# Patient Record
Sex: FEMALE | Race: OTHER | NOT HISPANIC OR LATINO | URBAN - METROPOLITAN AREA
[De-identification: names, ages, dates, MRNs, and addresses within clinical notes are randomized per-mention and may not be internally consistent; named-entity substitution may affect disease eponyms.]

---

## 2023-09-15 ENCOUNTER — INPATIENT (INPATIENT)
Facility: HOSPITAL | Age: 73
LOS: 3 days | Discharge: HOME | DRG: 91 | End: 2023-09-19
Attending: INTERNAL MEDICINE | Admitting: STUDENT IN AN ORGANIZED HEALTH CARE EDUCATION/TRAINING PROGRAM
Payer: MEDICARE

## 2023-09-15 VITALS
RESPIRATION RATE: 20 BRPM | OXYGEN SATURATION: 98 % | SYSTOLIC BLOOD PRESSURE: 142 MMHG | TEMPERATURE: 97 F | DIASTOLIC BLOOD PRESSURE: 92 MMHG | HEART RATE: 96 BPM

## 2023-09-15 PROCEDURE — 99285 EMERGENCY DEPT VISIT HI MDM: CPT | Mod: FS

## 2023-09-15 RX ORDER — MORPHINE SULFATE 50 MG/1
2 CAPSULE, EXTENDED RELEASE ORAL ONCE
Refills: 0 | Status: DISCONTINUED | OUTPATIENT
Start: 2023-09-15 | End: 2023-09-15

## 2023-09-15 RX ADMIN — MORPHINE SULFATE 2 MILLIGRAM(S): 50 CAPSULE, EXTENDED RELEASE ORAL at 23:51

## 2023-09-15 NOTE — ED PROVIDER NOTE - CLINICAL SUMMARY MEDICAL DECISION MAKING FREE TEXT BOX
Patient is admitted due to intractable pain for further evaluation and treatment after neurology evaluation and persistent/recurrent debilitating pain and spasm

## 2023-09-15 NOTE — ED PROVIDER NOTE - CONSIDERATION OF ADMISSION OBSERVATION
Patient is admitted due to intractable pain for further evaluation and treatment Consideration of Admission/Observation

## 2023-09-15 NOTE — ED PROVIDER NOTE - PHYSICAL EXAMINATION
As Follows:  CONST: Sitting in chair next to stretcher, tremulous.   EYES: PERRL, EOMI, Sclera and conjunctiva clear.   CARD: No murmurs, rubs, or gallops; Normal rate and rhythm  RESP: BS Equal B/L, No wheezes, rhonchi or rales. No distress or accessory breathing  GI: Soft, non-tender, non-distended.  MS: Nontender upper/lower extremities. Normal ROM in all extremities. No midline Cervical/Thoracic/Lumbar spinal tenderness.  SKIN: Small repaired laceration to left anterior shin, no signs of infection or drainage. Warm, dry, no acute rashes. MMM  NEURO: A&Ox3, Strength and sensation intact. Tremor to bilateral hands and legs.

## 2023-09-15 NOTE — ED PROVIDER NOTE - CARE PLAN
Principal Discharge DX:	Parkinson's disease   1 Principal Discharge DX:	Intractable pain  Secondary Diagnosis:	Muscle spasm  Secondary Diagnosis:	Parkinson's disease

## 2023-09-15 NOTE — ED PROVIDER NOTE - OBJECTIVE STATEMENT
Patient is a 73 year old female with pmhx of htn, hld, dm, parkinson's on carbodopa/levodopa presents with daughter and son in law for evaluation of worsening Parkinson symptoms over the past 2 weeks resulting in increasing bilateral lower extremity and bilateral hand pain. Patient has had some difficulty ambulating requiring assistance from family. Family states she is extremely uncomfortable and typically does not complain of symptoms. Patient admits to severe pain and is requesting pain medication. She denies any fever, cough, sore throat, N/V, chest pain, sob, abdominal pain, or urinary complaints. She also denies any trauma or injury.

## 2023-09-15 NOTE — ED PROVIDER NOTE - ATTENDING APP SHARED VISIT CONTRIBUTION OF CARE
72 yo female with PMH Parkinson's, HTN, HLD, DM presents for evaluation of lower leg cramping.  Over the past 1 to 2 weeks tremors have been increasing but over the past day she started to have more pain that she could not tolerate.  Reports just spasming feeling in her hands and lower extremities.  Denies any fevers or chills, cough, shortness of breath, chest pain, headache, focal weakness.  Following closely with her neurologist who has been toggling with her medications but symptoms have not improved.    VITAL SIGNS: noted  CONSTITUTIONAL: Well-developed; well-nourished; in no acute distress  HEAD: Normocephalic; atraumatic  EYES: PERRL, EOM intact; conjunctiva and sclera clear  ENT: No nasal discharge; airway clear. MMM  NECK: Supple; non tender.    CARD: S1, S2 normal; no murmurs, gallops, or rubs. Regular rate and rhythm  RESP: CTAB/L, no wheezes, rales or rhonchi  ABD: Normal bowel sounds; soft; non-distended; non-tender; no CVA tenderness  EXT: Normal ROM. No calf tenderness or edema. Distal pulses intact  NEURO: Alert, oriented. Grossly unremarkable. No focal deficits, + tremor noted bilateral, most prominent on UEs

## 2023-09-15 NOTE — ED PROVIDER NOTE - NSFOLLOWUPINSTRUCTIONS_ED_ALL_ED_FT
Follow up with your Neurologist.     Our Emergency Department Referral Coordinators will be reaching out to you in the next 24-48 hours from 9:00am to 5:00pm with a follow up appointment. Please expect a phone call from the hospital in that time frame. If you do not receive a call or if you have any questions or concerns, you can reach them at   [phone]      You have a history of Parkinson's disease. Parkinson disease (PD) is a long-term movement disorder. The brain cells that control movement start to die and cause changes in how you move, feel, and act. Even though PD may progress and have a severe impact on your daily life, it is not a life-threatening disease. Please continue your medications as prescribed and follow up with your doctor in the outpatient setting. Anti-Parkinson medicines are used to improve movement problems, such as muscle stiffness, twitches, and restlessness.  Your healthcare provider may use several types of this medicine to help manage your symptoms. However, Anti-Parkinson medications have a propensity of producing confusion, hallucinations, delirium. If you ever have this side effects, please see your healthcare provider immediately.  Seek care immediately if:  •You feel like hurting or killing yourself or others.  •You feel lightheaded, dizzy, or faint.  •You have chest pain or shortness of breath.  •You have weakness in an arm or leg.  •You become confused, or you have difficulty speaking.  •You have dizziness, a severe headache, vision changes, auditory/visual/tactile hallucinations.  Contact your healthcare provider or neurologist if:  •You have a fever.  •You are not sleeping well or you sleep more than usual.  •You cannot eat or are eating more than usual.  •You feel that your condition is getting worse.  •You have new symptoms since your last appointment.  •Your sad feelings or thoughts change the way you function during the day.  •You have questions or concerns about your condition or care.

## 2023-09-15 NOTE — ED ADULT NURSE NOTE - NSFALLRISKINTERV_ED_ALL_ED

## 2023-09-16 DIAGNOSIS — R52 PAIN, UNSPECIFIED: ICD-10-CM

## 2023-09-16 LAB
ALBUMIN SERPL ELPH-MCNC: 4.5 G/DL — SIGNIFICANT CHANGE UP (ref 3.5–5.2)
ALBUMIN SERPL ELPH-MCNC: 4.8 G/DL — SIGNIFICANT CHANGE UP (ref 3.5–5.2)
ALP SERPL-CCNC: 78 U/L — SIGNIFICANT CHANGE UP (ref 30–115)
ALP SERPL-CCNC: 80 U/L — SIGNIFICANT CHANGE UP (ref 30–115)
ALT FLD-CCNC: <5 U/L — SIGNIFICANT CHANGE UP (ref 0–41)
ALT FLD-CCNC: <5 U/L — SIGNIFICANT CHANGE UP (ref 0–41)
ANION GAP SERPL CALC-SCNC: 14 MMOL/L — SIGNIFICANT CHANGE UP (ref 7–14)
ANION GAP SERPL CALC-SCNC: 15 MMOL/L — HIGH (ref 7–14)
APPEARANCE UR: CLEAR — SIGNIFICANT CHANGE UP
AST SERPL-CCNC: 15 U/L — SIGNIFICANT CHANGE UP (ref 0–41)
AST SERPL-CCNC: 18 U/L — SIGNIFICANT CHANGE UP (ref 0–41)
BACTERIA # UR AUTO: NEGATIVE /HPF — SIGNIFICANT CHANGE UP
BASOPHILS # BLD AUTO: 0.02 K/UL — SIGNIFICANT CHANGE UP (ref 0–0.2)
BASOPHILS # BLD AUTO: 0.02 K/UL — SIGNIFICANT CHANGE UP (ref 0–0.2)
BASOPHILS NFR BLD AUTO: 0.2 % — SIGNIFICANT CHANGE UP (ref 0–1)
BASOPHILS NFR BLD AUTO: 0.3 % — SIGNIFICANT CHANGE UP (ref 0–1)
BILIRUB SERPL-MCNC: 0.9 MG/DL — SIGNIFICANT CHANGE UP (ref 0.2–1.2)
BILIRUB SERPL-MCNC: 0.9 MG/DL — SIGNIFICANT CHANGE UP (ref 0.2–1.2)
BILIRUB UR-MCNC: NEGATIVE — SIGNIFICANT CHANGE UP
BUN SERPL-MCNC: 25 MG/DL — HIGH (ref 10–20)
BUN SERPL-MCNC: 27 MG/DL — HIGH (ref 10–20)
CALCIUM SERPL-MCNC: 9.7 MG/DL — SIGNIFICANT CHANGE UP (ref 8.4–10.5)
CALCIUM SERPL-MCNC: 9.7 MG/DL — SIGNIFICANT CHANGE UP (ref 8.4–10.5)
CAST: 1 /LPF — SIGNIFICANT CHANGE UP (ref 0–4)
CHLORIDE SERPL-SCNC: 101 MMOL/L — SIGNIFICANT CHANGE UP (ref 98–110)
CHLORIDE SERPL-SCNC: 99 MMOL/L — SIGNIFICANT CHANGE UP (ref 98–110)
CK SERPL-CCNC: 203 U/L — SIGNIFICANT CHANGE UP (ref 0–225)
CK SERPL-CCNC: 206 U/L — SIGNIFICANT CHANGE UP (ref 0–225)
CO2 SERPL-SCNC: 21 MMOL/L — SIGNIFICANT CHANGE UP (ref 17–32)
CO2 SERPL-SCNC: 21 MMOL/L — SIGNIFICANT CHANGE UP (ref 17–32)
COLOR SPEC: YELLOW — SIGNIFICANT CHANGE UP
CREAT SERPL-MCNC: 1.1 MG/DL — SIGNIFICANT CHANGE UP (ref 0.7–1.5)
CREAT SERPL-MCNC: 1.1 MG/DL — SIGNIFICANT CHANGE UP (ref 0.7–1.5)
CRP SERPL-MCNC: <3 MG/L — SIGNIFICANT CHANGE UP
CRP SERPL-MCNC: <3 MG/L — SIGNIFICANT CHANGE UP
D DIMER BLD IA.RAPID-MCNC: 228 NG/ML DDU — SIGNIFICANT CHANGE UP
DIFF PNL FLD: NEGATIVE — SIGNIFICANT CHANGE UP
EGFR: 53 ML/MIN/1.73M2 — LOW
EGFR: 53 ML/MIN/1.73M2 — LOW
EOSINOPHIL # BLD AUTO: 0.01 K/UL — SIGNIFICANT CHANGE UP (ref 0–0.7)
EOSINOPHIL # BLD AUTO: 0.05 K/UL — SIGNIFICANT CHANGE UP (ref 0–0.7)
EOSINOPHIL NFR BLD AUTO: 0.1 % — SIGNIFICANT CHANGE UP (ref 0–8)
EOSINOPHIL NFR BLD AUTO: 0.6 % — SIGNIFICANT CHANGE UP (ref 0–8)
ERYTHROCYTE [SEDIMENTATION RATE] IN BLOOD: 15 MM/HR — SIGNIFICANT CHANGE UP (ref 0–20)
ERYTHROCYTE [SEDIMENTATION RATE] IN BLOOD: 9 MM/HR — SIGNIFICANT CHANGE UP (ref 0–20)
GLUCOSE BLDC GLUCOMTR-MCNC: 111 MG/DL — HIGH (ref 70–99)
GLUCOSE BLDC GLUCOMTR-MCNC: 116 MG/DL — HIGH (ref 70–99)
GLUCOSE BLDC GLUCOMTR-MCNC: 118 MG/DL — HIGH (ref 70–99)
GLUCOSE BLDC GLUCOMTR-MCNC: 147 MG/DL — HIGH (ref 70–99)
GLUCOSE SERPL-MCNC: 105 MG/DL — HIGH (ref 70–99)
GLUCOSE SERPL-MCNC: 117 MG/DL — HIGH (ref 70–99)
GLUCOSE UR QL: NEGATIVE MG/DL — SIGNIFICANT CHANGE UP
HCT VFR BLD CALC: 38.9 % — SIGNIFICANT CHANGE UP (ref 37–47)
HCT VFR BLD CALC: 39.9 % — SIGNIFICANT CHANGE UP (ref 37–47)
HGB BLD-MCNC: 13 G/DL — SIGNIFICANT CHANGE UP (ref 12–16)
HGB BLD-MCNC: 13.3 G/DL — SIGNIFICANT CHANGE UP (ref 12–16)
IMM GRANULOCYTES NFR BLD AUTO: 0.3 % — SIGNIFICANT CHANGE UP (ref 0.1–0.3)
IMM GRANULOCYTES NFR BLD AUTO: 0.3 % — SIGNIFICANT CHANGE UP (ref 0.1–0.3)
KETONES UR-MCNC: 15 MG/DL
LEUKOCYTE ESTERASE UR-ACNC: ABNORMAL
LYMPHOCYTES # BLD AUTO: 1.68 K/UL — SIGNIFICANT CHANGE UP (ref 1.2–3.4)
LYMPHOCYTES # BLD AUTO: 1.88 K/UL — SIGNIFICANT CHANGE UP (ref 1.2–3.4)
LYMPHOCYTES # BLD AUTO: 19.2 % — LOW (ref 20.5–51.1)
LYMPHOCYTES # BLD AUTO: 24.4 % — SIGNIFICANT CHANGE UP (ref 20.5–51.1)
MAGNESIUM SERPL-MCNC: 2.1 MG/DL — SIGNIFICANT CHANGE UP (ref 1.8–2.4)
MAGNESIUM SERPL-MCNC: 2.2 MG/DL — SIGNIFICANT CHANGE UP (ref 1.8–2.4)
MCHC RBC-ENTMCNC: 30 PG — SIGNIFICANT CHANGE UP (ref 27–31)
MCHC RBC-ENTMCNC: 30.4 PG — SIGNIFICANT CHANGE UP (ref 27–31)
MCHC RBC-ENTMCNC: 33.3 G/DL — SIGNIFICANT CHANGE UP (ref 32–37)
MCHC RBC-ENTMCNC: 33.4 G/DL — SIGNIFICANT CHANGE UP (ref 32–37)
MCV RBC AUTO: 90.1 FL — SIGNIFICANT CHANGE UP (ref 81–99)
MCV RBC AUTO: 90.9 FL — SIGNIFICANT CHANGE UP (ref 81–99)
MONOCYTES # BLD AUTO: 0.56 K/UL — SIGNIFICANT CHANGE UP (ref 0.1–0.6)
MONOCYTES # BLD AUTO: 0.65 K/UL — HIGH (ref 0.1–0.6)
MONOCYTES NFR BLD AUTO: 7.3 % — SIGNIFICANT CHANGE UP (ref 1.7–9.3)
MONOCYTES NFR BLD AUTO: 7.4 % — SIGNIFICANT CHANGE UP (ref 1.7–9.3)
NEUTROPHILS # BLD AUTO: 5.19 K/UL — SIGNIFICANT CHANGE UP (ref 1.4–6.5)
NEUTROPHILS # BLD AUTO: 6.34 K/UL — SIGNIFICANT CHANGE UP (ref 1.4–6.5)
NEUTROPHILS NFR BLD AUTO: 67.1 % — SIGNIFICANT CHANGE UP (ref 42.2–75.2)
NEUTROPHILS NFR BLD AUTO: 72.8 % — SIGNIFICANT CHANGE UP (ref 42.2–75.2)
NITRITE UR-MCNC: NEGATIVE — SIGNIFICANT CHANGE UP
NRBC # BLD: 0 /100 WBCS — SIGNIFICANT CHANGE UP (ref 0–0)
NRBC # BLD: 0 /100 WBCS — SIGNIFICANT CHANGE UP (ref 0–0)
PH UR: 6 — SIGNIFICANT CHANGE UP (ref 5–8)
PHOSPHATE SERPL-MCNC: 3.5 MG/DL — SIGNIFICANT CHANGE UP (ref 2.1–4.9)
PLATELET # BLD AUTO: 258 K/UL — SIGNIFICANT CHANGE UP (ref 130–400)
PLATELET # BLD AUTO: 320 K/UL — SIGNIFICANT CHANGE UP (ref 130–400)
PMV BLD: 10.6 FL — HIGH (ref 7.4–10.4)
PMV BLD: 10.9 FL — HIGH (ref 7.4–10.4)
POTASSIUM SERPL-MCNC: 4.5 MMOL/L — SIGNIFICANT CHANGE UP (ref 3.5–5)
POTASSIUM SERPL-MCNC: 4.6 MMOL/L — SIGNIFICANT CHANGE UP (ref 3.5–5)
POTASSIUM SERPL-SCNC: 4.5 MMOL/L — SIGNIFICANT CHANGE UP (ref 3.5–5)
POTASSIUM SERPL-SCNC: 4.6 MMOL/L — SIGNIFICANT CHANGE UP (ref 3.5–5)
PROT SERPL-MCNC: 6.9 G/DL — SIGNIFICANT CHANGE UP (ref 6–8)
PROT SERPL-MCNC: 7.3 G/DL — SIGNIFICANT CHANGE UP (ref 6–8)
PROT UR-MCNC: NEGATIVE MG/DL — SIGNIFICANT CHANGE UP
RBC # BLD: 4.28 M/UL — SIGNIFICANT CHANGE UP (ref 4.2–5.4)
RBC # BLD: 4.43 M/UL — SIGNIFICANT CHANGE UP (ref 4.2–5.4)
RBC # FLD: 13.1 % — SIGNIFICANT CHANGE UP (ref 11.5–14.5)
RBC # FLD: 13.1 % — SIGNIFICANT CHANGE UP (ref 11.5–14.5)
RBC CASTS # UR COMP ASSIST: 1 /HPF — SIGNIFICANT CHANGE UP (ref 0–4)
SODIUM SERPL-SCNC: 135 MMOL/L — SIGNIFICANT CHANGE UP (ref 135–146)
SODIUM SERPL-SCNC: 136 MMOL/L — SIGNIFICANT CHANGE UP (ref 135–146)
SP GR SPEC: 1.02 — SIGNIFICANT CHANGE UP (ref 1–1.03)
SQUAMOUS # UR AUTO: 1 /HPF — SIGNIFICANT CHANGE UP (ref 0–5)
UROBILINOGEN FLD QL: 1 MG/DL — SIGNIFICANT CHANGE UP (ref 0.2–1)
WBC # BLD: 7.72 K/UL — SIGNIFICANT CHANGE UP (ref 4.8–10.8)
WBC # BLD: 8.73 K/UL — SIGNIFICANT CHANGE UP (ref 4.8–10.8)
WBC # FLD AUTO: 7.72 K/UL — SIGNIFICANT CHANGE UP (ref 4.8–10.8)
WBC # FLD AUTO: 8.73 K/UL — SIGNIFICANT CHANGE UP (ref 4.8–10.8)
WBC UR QL: 14 /HPF — HIGH (ref 0–5)

## 2023-09-16 PROCEDURE — 36415 COLL VENOUS BLD VENIPUNCTURE: CPT

## 2023-09-16 PROCEDURE — 84100 ASSAY OF PHOSPHORUS: CPT

## 2023-09-16 PROCEDURE — 82550 ASSAY OF CK (CPK): CPT

## 2023-09-16 PROCEDURE — 93970 EXTREMITY STUDY: CPT

## 2023-09-16 PROCEDURE — 85379 FIBRIN DEGRADATION QUANT: CPT

## 2023-09-16 PROCEDURE — 82962 GLUCOSE BLOOD TEST: CPT

## 2023-09-16 PROCEDURE — 85027 COMPLETE CBC AUTOMATED: CPT

## 2023-09-16 PROCEDURE — 80053 COMPREHEN METABOLIC PANEL: CPT

## 2023-09-16 PROCEDURE — 82085 ASSAY OF ALDOLASE: CPT

## 2023-09-16 PROCEDURE — 86803 HEPATITIS C AB TEST: CPT

## 2023-09-16 PROCEDURE — 85652 RBC SED RATE AUTOMATED: CPT

## 2023-09-16 PROCEDURE — 83735 ASSAY OF MAGNESIUM: CPT

## 2023-09-16 PROCEDURE — 72110 X-RAY EXAM L-2 SPINE 4/>VWS: CPT

## 2023-09-16 PROCEDURE — 83036 HEMOGLOBIN GLYCOSYLATED A1C: CPT

## 2023-09-16 PROCEDURE — 86140 C-REACTIVE PROTEIN: CPT

## 2023-09-16 PROCEDURE — 99222 1ST HOSP IP/OBS MODERATE 55: CPT

## 2023-09-16 PROCEDURE — 85025 COMPLETE CBC W/AUTO DIFF WBC: CPT

## 2023-09-16 PROCEDURE — 72050 X-RAY EXAM NECK SPINE 4/5VWS: CPT

## 2023-09-16 PROCEDURE — 97162 PT EVAL MOD COMPLEX 30 MIN: CPT | Mod: GP

## 2023-09-16 PROCEDURE — 81001 URINALYSIS AUTO W/SCOPE: CPT

## 2023-09-16 PROCEDURE — 72070 X-RAY EXAM THORAC SPINE 2VWS: CPT

## 2023-09-16 RX ORDER — CARBIDOPA AND LEVODOPA 25; 100 MG/1; MG/1
2 TABLET ORAL
Refills: 0 | DISCHARGE

## 2023-09-16 RX ORDER — GLUCAGON INJECTION, SOLUTION 0.5 MG/.1ML
1 INJECTION, SOLUTION SUBCUTANEOUS ONCE
Refills: 0 | Status: DISCONTINUED | OUTPATIENT
Start: 2023-09-16 | End: 2023-09-19

## 2023-09-16 RX ORDER — INSULIN LISPRO 100/ML
VIAL (ML) SUBCUTANEOUS
Refills: 0 | Status: DISCONTINUED | OUTPATIENT
Start: 2023-09-16 | End: 2023-09-19

## 2023-09-16 RX ORDER — SODIUM CHLORIDE 9 MG/ML
1000 INJECTION, SOLUTION INTRAVENOUS
Refills: 0 | Status: DISCONTINUED | OUTPATIENT
Start: 2023-09-16 | End: 2023-09-19

## 2023-09-16 RX ORDER — NEBIVOLOL HYDROCHLORIDE 5 MG/1
1 TABLET ORAL
Refills: 0 | DISCHARGE

## 2023-09-16 RX ORDER — ENOXAPARIN SODIUM 100 MG/ML
40 INJECTION SUBCUTANEOUS EVERY 24 HOURS
Refills: 0 | Status: DISCONTINUED | OUTPATIENT
Start: 2023-09-16 | End: 2023-09-19

## 2023-09-16 RX ORDER — CARBIDOPA AND LEVODOPA 25; 100 MG/1; MG/1
2 TABLET ORAL
Refills: 0 | Status: DISCONTINUED | OUTPATIENT
Start: 2023-09-16 | End: 2023-09-19

## 2023-09-16 RX ORDER — ZOLPIDEM TARTRATE 10 MG/1
1 TABLET ORAL
Refills: 0 | DISCHARGE

## 2023-09-16 RX ORDER — DESVENLAFAXINE 50 MG/1
50 TABLET, EXTENDED RELEASE ORAL DAILY
Refills: 0 | Status: DISCONTINUED | OUTPATIENT
Start: 2023-09-16 | End: 2023-09-18

## 2023-09-16 RX ORDER — DEXTROSE 50 % IN WATER 50 %
12.5 SYRINGE (ML) INTRAVENOUS ONCE
Refills: 0 | Status: DISCONTINUED | OUTPATIENT
Start: 2023-09-16 | End: 2023-09-19

## 2023-09-16 RX ORDER — DEXTROSE 50 % IN WATER 50 %
25 SYRINGE (ML) INTRAVENOUS ONCE
Refills: 0 | Status: DISCONTINUED | OUTPATIENT
Start: 2023-09-16 | End: 2023-09-19

## 2023-09-16 RX ORDER — CHLORHEXIDINE GLUCONATE 213 G/1000ML
1 SOLUTION TOPICAL
Refills: 0 | Status: DISCONTINUED | OUTPATIENT
Start: 2023-09-16 | End: 2023-09-19

## 2023-09-16 RX ORDER — BACLOFEN 100 %
5 POWDER (GRAM) MISCELLANEOUS EVERY 8 HOURS
Refills: 0 | Status: DISCONTINUED | OUTPATIENT
Start: 2023-09-16 | End: 2023-09-17

## 2023-09-16 RX ORDER — METFORMIN HYDROCHLORIDE 850 MG/1
1 TABLET ORAL
Refills: 0 | DISCHARGE

## 2023-09-16 RX ORDER — KETOROLAC TROMETHAMINE 30 MG/ML
15 SYRINGE (ML) INJECTION ONCE
Refills: 0 | Status: DISCONTINUED | OUTPATIENT
Start: 2023-09-16 | End: 2023-09-16

## 2023-09-16 RX ORDER — ACETAMINOPHEN 500 MG
650 TABLET ORAL EVERY 6 HOURS
Refills: 0 | Status: DISCONTINUED | OUTPATIENT
Start: 2023-09-16 | End: 2023-09-19

## 2023-09-16 RX ORDER — ZOLPIDEM TARTRATE 10 MG/1
5 TABLET ORAL AT BEDTIME
Refills: 0 | Status: DISCONTINUED | OUTPATIENT
Start: 2023-09-16 | End: 2023-09-18

## 2023-09-16 RX ORDER — BACLOFEN 100 %
5 POWDER (GRAM) MISCELLANEOUS ONCE
Refills: 0 | Status: COMPLETED | OUTPATIENT
Start: 2023-09-16 | End: 2023-09-16

## 2023-09-16 RX ORDER — DEXTROSE 50 % IN WATER 50 %
15 SYRINGE (ML) INTRAVENOUS ONCE
Refills: 0 | Status: DISCONTINUED | OUTPATIENT
Start: 2023-09-16 | End: 2023-09-19

## 2023-09-16 RX ORDER — DIAZEPAM 5 MG
2 TABLET ORAL ONCE
Refills: 0 | Status: DISCONTINUED | OUTPATIENT
Start: 2023-09-16 | End: 2023-09-16

## 2023-09-16 RX ORDER — DESVENLAFAXINE 50 MG/1
50 TABLET, EXTENDED RELEASE ORAL
Refills: 0 | DISCHARGE

## 2023-09-16 RX ADMIN — CARBIDOPA AND LEVODOPA 2 TABLET(S): 25; 100 TABLET ORAL at 18:26

## 2023-09-16 RX ADMIN — Medication 15 MILLIGRAM(S): at 01:57

## 2023-09-16 RX ADMIN — Medication 650 MILLIGRAM(S): at 06:25

## 2023-09-16 RX ADMIN — CARBIDOPA AND LEVODOPA 2 TABLET(S): 25; 100 TABLET ORAL at 12:47

## 2023-09-16 RX ADMIN — CARBIDOPA AND LEVODOPA 2 TABLET(S): 25; 100 TABLET ORAL at 21:09

## 2023-09-16 RX ADMIN — CARBIDOPA AND LEVODOPA 2 TABLET(S): 25; 100 TABLET ORAL at 14:11

## 2023-09-16 RX ADMIN — Medication 2 MILLIGRAM(S): at 00:37

## 2023-09-16 RX ADMIN — Medication 5 MILLIGRAM(S): at 14:11

## 2023-09-16 RX ADMIN — Medication 5 MILLIGRAM(S): at 05:00

## 2023-09-16 RX ADMIN — Medication 15 MILLIGRAM(S): at 09:00

## 2023-09-16 RX ADMIN — Medication 5 MILLIGRAM(S): at 21:09

## 2023-09-16 RX ADMIN — ZOLPIDEM TARTRATE 5 MILLIGRAM(S): 10 TABLET ORAL at 22:30

## 2023-09-16 RX ADMIN — ENOXAPARIN SODIUM 40 MILLIGRAM(S): 100 INJECTION SUBCUTANEOUS at 18:26

## 2023-09-16 NOTE — CONSULT NOTE ADULT - ASSESSMENT
74 yo F w PMH of Parkinsonism, DM2, HTN, HLD p/w increased pain in her both legs, predominantly at b/l knees for the last 2 weeks. She has long hx of Parkinsonism and she is on max dose of Sinemet (2000mg/day) and per  her it doesn't helping at all. Her neurological exam is remarkable for b/l akinetic-rigid syndrome with parkinsonian features, including axial muscles, spastic or mix muscle tone in b/l LE, some tenderness of back of her knee joints. No falls, no numbness, no increased weakness or LOC w seizures. Her increased pain and muscle tone most likely related to progressing her parkinsonism with prominent muscle spasms. She has an upcoming appointment w her movement specialist in 4 days and she should follow up with her. For muscle and joint/leg pain: she needs inflammatory markers to be checked with muscle enzymes as well to rule out statin induced myopathy, myositis.      Recommendations:     - ESR, CRP, CPK, Aldolase, U/A  - b/l venous doppler to rule out DVT  - Follow up w Movement disorder specialist (upcoming appointment), for now - c/w current Sinemet regimen  - Pain management per primary team  - Trial of Baclofen starting 5 mg tid w uptitrating every 3 days till 15 mg tid if no side effects  - May increase Desvenlafaxine to 100 mg/day.       Pending attending attestation.      72 yo F w PMH of Parkinsonism, DM2, HTN, HLD p/w increased pain in her both legs, predominantly at b/l knees for the last 2 weeks. She has long hx of Parkinsonism and she is on max dose of Sinemet (2000mg/day) and per  her it doesn't helping at all. Her neurological exam is remarkable for b/l akinetic-rigid syndrome with parkinsonian features, including axial muscles, spastic or mix muscle tone in b/l LE, some tenderness of back of her knee joints. No falls, no numbness, no increased weakness or LOC w seizures. Her increased pain and muscle tone most likely related to progressing her parkinsonism with prominent muscle spasms. She has an upcoming appointment w her movement specialist in 4 days and she should follow up with her. For muscle and joint/leg pain: she needs inflammatory markers to be checked with muscle enzymes as well to rule out statin induced myopathy, myositis.      Recommendations:     - ESR, CRP, CPK, Aldolase, U/A  - Follow up w Movement disorder specialist (upcoming appointment), for now - c/w current Sinemet regimen  - Pain management per primary team  - Trial of Baclofen starting 5 mg tid w uptitrating every 3 days till 15 mg tid if no side effects  - May increase Desvenlafaxine to 100 mg/day.   - c/w her home medications (if CPK elevated - may hold statins)      Pending attending attestation.      72 yo F w PMH of Parkinsonism, DM2, HTN, HLD p/w increased pain in her both legs, predominantly at b/l knees for the last 2 weeks. She has long hx of Parkinsonism and she is on max dose of Sinemet (2000mg/day) and per  her it doesn't helping at all. Her neurological exam is remarkable for b/l akinetic-rigid syndrome with parkinsonian features, including axial muscles, spastic or mix muscle tone in b/l LE, some tenderness of back of her knee joints. No falls, no numbness, no increased weakness or LOC w seizures. Her increased pain and muscle tone most likely related to progressing her parkinsonism with prominent muscle spasms. She has an upcoming appointment w her movement specialist in 4 days and she should follow up with her. For muscle and joint/leg pain: she needs inflammatory markers to be checked with muscle enzymes as well to rule out statin induced myopathy, myositis.      Recommendations:     - ESR, CRP, CPK, Aldolase, U/A  - b/l venous doppler to r/o DVT  - Follow up w Movement disorder specialist (upcoming appointment), for now - c/w current Sinemet regimen  - Pain management per primary team  - Trial of Baclofen starting 5 mg tid w uptitrating every 3 days till 15 mg tid if no side effects  - May increase Desvenlafaxine to 100 mg/day.   - c/w her home medications (if CPK elevated - may hold statins)      Pending attending attestation.

## 2023-09-16 NOTE — H&P ADULT - HIV OFFER
Cath by Dr Delong showed 80% LAD, 90% in circumflex with normal LV. Drug eluding stents placed in both.    Opt out

## 2023-09-16 NOTE — H&P ADULT - HISTORY OF PRESENT ILLNESS
73 year old female PMHx HTN, HLD, DM Type II, Parkinson's, presents to the ED with increased pain in b/l legs for the last 2 weeks. She is on the max dose of Sinemet (2000mg/day) but has had worsening pain and muscle spasms.  She has an upcoming appointment with her movement specialist. Denies fevers, urinary/bowel incontinence, saddle anesthesia, chest pain, SOB, N/V/D. Currently pain and spasms are well controlled.    T(F): 99.7 HR: 98 BP: 143/86 RR: 18 SpO2: 95%  Labs unremarkable  U/A negative   Admit to Medicine

## 2023-09-16 NOTE — CHART NOTE - NSCHARTNOTEFT_GEN_A_CORE
Went back to speak to patient to better clarify her diagnosis and current complaints.  I also called daughter Melissa while I was with patient.  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  She has had dx of parkinsons for 13 years and has been following with neurologist in [facility].  She has had contiued medication adjustments of her sinemet over the last few months because she believes she is getting worse.    More recently she has been having lower back pain which in the past has been intermittent and for last 1.5 weeks painful spasms in the LE mostly at the calves b/l.  She has been trying to deal with it for last week and has been less ambulatory because of these spasms.  She came to hospital because the spasms in the calves become so severe that she was screaming in pain and could not stand on her feet.  The daughter believes her mother is getting too much medication and she has not been improving.  Patient currently on re-evaluation is lying on her left side and says she is having severe lower back pain and asking for pain medications.    Would recommend obtaining imaging of her spine as her current admission appears to be more related to back pain and spasms.  While the high dose of carbidopa can increase the chance of spasms would need to evaluate for structural causes first.    1. Xray cervical, thoracic and lumbar spine (patient does not want MRI unless she is sedated or heavily medicated)  2. Continue home dose of sinemet (daughter bringing in her medications shortly)  3. Would give baclofen standing at 5mg q8 hours for now  4. pain control  5. Avoid skipping doses of sinemet as this may cause the patient to go through dopamine withdrawal which can significantly worsen neurological symptoms  6. Will follow

## 2023-09-16 NOTE — PATIENT PROFILE ADULT - FALL HARM RISK - HARM RISK INTERVENTIONS

## 2023-09-16 NOTE — H&P ADULT - NSHPPHYSICALEXAM_GEN_ALL_CORE
GENERAL: NAD, lying in bed comfortably  CHEST/LUNG: CTAB  HEART: RRR no MRG  ABDOMEN: BSx4; Soft, nontender, nondistended  EXTREMITIES: No edema  NERVOUS SYSTEM:  A&Ox3, no focal deficits. Cogwheel rigidity and tremors in b/l ULEs. Tenderness with ROM in b/l knees.  SKIN: No rashes or lesions GENERAL: NAD, lying in bed comfortably  CHEST/LUNG: CTAB  HEART: RRR no MRG  ABDOMEN: BSx4; Soft, nontender, nondistended  EXTREMITIES: No edema  NERVOUS SYSTEM:  A&Ox3, no focal deficits. Cogwheel rigidity and tremors in b/l ULEs. Tenderness and pain with ROM in b/l knees.  SKIN: No rashes or lesions

## 2023-09-16 NOTE — H&P ADULT - NSHPLABSRESULTS_GEN_ALL_CORE
LABS:                        13.0   8.73  )-----------( 258      ( 16 Sep 2023 05:38 )             38.9     09-16    136  |  101  |  27<H>  ----------------------------<  117<H>  4.5   |  21  |  1.1    Ca    9.7      16 Sep 2023 05:38  Phos  3.5     09-16  Mg     2.2     09-16    TPro  6.9  /  Alb  4.5  /  TBili  0.9  /  DBili  x   /  AST  15  /  ALT  <5  /  AlkPhos  78  09-16

## 2023-09-16 NOTE — CONSULT NOTE ADULT - SUBJECTIVE AND OBJECTIVE BOX
NEUROLOGY CONSULT    HPI: 72 yo F w PMH of Parkinsonism, DM2, HTN, HLD p/w increased pain in her both legs, predominantly at b/l knees for the last 2 weeks. Today her symptoms got worse, and when her kids came to visit her, they decided to go to the ED. Morphine 2 IV, Diazepam 2 IV was given with transient shor-time effect, Ketorolac was given recently. She has parkinsonism tremor and rigidity for more than 16 years, cannot tell the specifics but following movement specialist in [place] and she is on Sinemet 500 qid (10-14-18-22) and she states that it was increased recently w/o effect. Other than that she is on Desvenlafaxine 50 mg, Bystolic 5, Lipitor 20, Metformin 500 tid, Zolpidem 10 mg.          MEDICATIONS  Home Medications:    MEDICATIONS  (STANDING):    MEDICATIONS  (PRN):      FAMILY HISTORY:    SOCIAL HISTORY: negative for tobacco, alcohol, or ilicit drug use.    Allergies    No Known Allergies    Intolerances             NEUROLOGICAL EXAMINATION:  GENERAL:  Appearance is consistent with chronologic age, anxious, keep stating "help me doctor"   COGNITION/LANGUAGE:  Awake, alert, and oriented to person, place, time and date.  Fluent, intact comprehension, repetition, naming. Recent and remote memory intact.  Fund of knowledge is appropriate.  Nondysarthric.    CRANIAL NERVES:   - Eyes:  Visual acuity intact. Pupils equal round and reactive, no RAPD. EOMI w/o nystagmus, skew or reported double vision.   No ptosis/weakness of eyelid closure.   - Face:  Amimia no facial asymmetry.    - Ears/Nose/Throat:  Hearing grossly intact b/l to finger rub.  Palate elevates midline.  Tongue and uvula midline.   MOTOR EXAM:  (R/L) 5/5 UE; -5/-5 LE.  No observable drift. Cogwheeling in b/l arms, spastic tone in b/l lower extremities. Tenderness over knee joints, LE muscles, b/l tremors in UE.    SENSORY EXAM:  Intact to light touch and pinprick, pain, temperature and proprioception and vibration in all extremities.  REFLEXES:   2+ b/l biceps, triceps, patella and achilles.  Plantar response downgoing b/l.  Jaw jerk, Dionicio, clonus absent.  CEREBELLUM:  Finger to nose/Heel to knee and shin intact.  No dysmetria.    GAIT: shuffling gait, turning en-block.   Romberg: negative.      LABS:                        13.3   7.72  )-----------( 320      ( 16 Sep 2023 00:00 )             39.9     09-16    135  |  99  |  25<H>  ----------------------------<  105<H>  4.6   |  21  |  1.1    Ca    9.7      16 Sep 2023 00:00  Mg     2.1     09-16    TPro  7.3  /  Alb  4.8  /  TBili  0.9  /  DBili  x   /  AST  18  /  ALT  <5  /  AlkPhos  80  09-16    Hemoglobin A1C:   Vitamin B12     CAPILLARY BLOOD GLUCOSE  Urinalysis Basic - ( 16 Sep 2023 00:00 )  Color: x / Appearance: x / SG: x / pH: x  Gluc: 105 mg/dL / Ketone: x  / Bili: x / Urobili: x   Blood: x / Protein: x / Nitrite: x   Leuk Esterase: x / RBC: x / WBC x   Sq Epi: x / Non Sq Epi: x / Bacteria: x    Microbiology:    RADIOLOGY, EKG AND ADDITIONAL TESTS: Reviewed.           NEUROLOGY CONSULT    HPI: 74 yo F w PMH of Parkinsonism, DM2, HTN, HLD p/w increased pain in her both legs, predominantly at b/l knees for the last 2 weeks. Today her symptoms got worse, and when her kids came to visit her, they decided to go to the ED. In the ED: Morphine 2 IV, Diazepam 2 IV was given with transient short-time effect, Ketorolac was given recently. She has parkinsonism tremor and rigidity for more than 16 years, cannot tell the specifics but following movement specialist in [place] and she is on Sinemet 500 qid (10-14-18-22) and she states that it was increased recently w/o effect. Other than that she is on Desvenlafaxine 50 mg, Bystolic 5, Lipitor 20, Metformin 500 tid, Zolpidem 10 mg.          MEDICATIONS  Home Medications:    MEDICATIONS  (STANDING):    MEDICATIONS  (PRN):      FAMILY HISTORY:    SOCIAL HISTORY: negative for tobacco, alcohol, or ilicit drug use.    Allergies    No Known Allergies    Intolerances             NEUROLOGICAL EXAMINATION:  GENERAL:  Appearance is consistent with chronologic age, anxious, keep stating "help me doctor"   COGNITION/LANGUAGE:  Awake, alert, and oriented to person, place, time and date.  Fluent, intact comprehension, repetition, naming. Recent and remote memory intact.  Fund of knowledge is appropriate.  Nondysarthric.    CRANIAL NERVES:   - Eyes:  Visual acuity intact. Pupils equal round and reactive, no RAPD. EOMI w/o nystagmus, skew or reported double vision.   No ptosis/weakness of eyelid closure.   - Face:  Amimia no facial asymmetry.    - Ears/Nose/Throat:  Hearing grossly intact b/l to finger rub.  Palate elevates midline.  Tongue and uvula midline.   MOTOR EXAM:  (R/L) 5/5 UE; -5/-5 LE.  No observable drift. Cogwheeling in b/l arms, spastic tone in b/l lower extremities. Tenderness over knee joints, LE muscles, b/l tremors in UE.    SENSORY EXAM:  Intact to light touch and pinprick, pain, temperature and proprioception and vibration in all extremities.  REFLEXES:   2+ b/l biceps, triceps, patella and achilles.  Plantar response downgoing b/l.  Jaw jerk, Dionicio, clonus absent.  CEREBELLUM:  Finger to nose/Heel to knee and shin intact.  No dysmetria.    GAIT: shuffling gait, turning en-block.   Romberg: negative.      LABS:                        13.3   7.72  )-----------( 320      ( 16 Sep 2023 00:00 )             39.9     09-16    135  |  99  |  25<H>  ----------------------------<  105<H>  4.6   |  21  |  1.1    Ca    9.7      16 Sep 2023 00:00  Mg     2.1     09-16    TPro  7.3  /  Alb  4.8  /  TBili  0.9  /  DBili  x   /  AST  18  /  ALT  <5  /  AlkPhos  80  09-16    Hemoglobin A1C:   Vitamin B12     CAPILLARY BLOOD GLUCOSE  Urinalysis Basic - ( 16 Sep 2023 00:00 )  Color: x / Appearance: x / SG: x / pH: x  Gluc: 105 mg/dL / Ketone: x  / Bili: x / Urobili: x   Blood: x / Protein: x / Nitrite: x   Leuk Esterase: x / RBC: x / WBC x   Sq Epi: x / Non Sq Epi: x / Bacteria: x    Microbiology:    RADIOLOGY, EKG AND ADDITIONAL TESTS: Reviewed.

## 2023-09-16 NOTE — CONSULT NOTE ADULT - ATTENDING COMMENTS
Patient seen and examined and agree with above except as noted.  Patients history, notes, labs, imaging, vitals and meds reviewed personally.  Patients presentation related to b/l LE pain.  Has increased tone in LE and is being treated by movement specialist in [facility] for Parkinsonism currently on high dose Sinemet.  Would not change current medications however recommend following with movement specialist (appointment in 3 days) and should discuss about evaluation for DBS (unclear whether she would be a candidate given poor response to dopamine agonists).    Plan as above

## 2023-09-16 NOTE — H&P ADULT - NSHPREVIEWOFSYSTEMS_GEN_ALL_CORE
CONSTITUTIONAL: No weakness, fevers, or chills  EYES/ENT: No visual changes;  No vertigo or throat pain   NECK: No pain or stiffness  RESPIRATORY: No cough, wheezing, hemoptysis; No shortness of breath  CARDIOVASCULAR: No chest pain or palpitations  GASTROINTESTINAL: No abdominal or epigastric pain; No nausea, vomiting, diarrhea, or constipation; No hematemesis, melena, or hematochezia  GENITOURINARY: No dysuria, frequency, or hematuria  NEUROLOGICAL: No numbness   SKIN: No itching or new rashes

## 2023-09-16 NOTE — H&P ADULT - ASSESSMENT
73 year old female PMHx HTN, HLD, DM Type II, Parkinson's, presents to the ED with increased pain in b/l legs for the last 2 weeks. She is on the max dose of Sinemet (2000mg/day) but has had worsening pain and muscle spasms. Has an appointment with Movement specialist in a few days.    # Worsening pain and muscle spasm likely 2/2 progression of Parkinson's  # hx Parkinson's 15 years  - c/w Sinemet 1000/50 4x/day  - CPK, inflammatory markers wnl  - f/u venous duplex  - Neuro consulted  - trial of Baclofen starting with 5mg TID; up-titrate to 15mg TID if no side effects  - may increase Desvenlafaxine to 100mg/day (daughter will bring from home)  - f/u neuro for any further recommendations     # HTN  - Bistolic 5mg     # HLD  - Atorvastatin 20mg     # DM  - home med: Metformin 500mg BID  - ISS, monitor FS, target 140-180    #DVT ppx: Lovenox  #Diet: DASH  #Activity: IAT  #Dispo: Medicine            73 year old female PMHx HTN, HLD, DM Type II, Parkinson's, presents to the ED with increased pain in b/l legs for the last 2 weeks. She is on the max dose of Sinemet (2000mg/day) but has had worsening pain and muscle spasms. Has an appointment with Movement specialist in a few days.    # Worsening pain and muscle spasm likely 2/2 progression of Parkinson's  # hx Parkinson's 15 years  - c/w Sinemet 1000/50 4x/day  - CPK, inflammatory markers, electrolytes wnl  - f/u venous duplex  - Neuro consulted  - trial of Baclofen starting with 5mg TID; up-titrate to 15mg TID if no side effects  - may increase Desvenlafaxine to 100mg/day (daughter will bring from home)  - f/u neuro for any further recommendations     # HTN  - Bistolic 5mg     # HLD  - Atorvastatin 20mg     # DM  - home med: Metformin 500mg BID  - ISS, monitor FS, target 140-180    #DVT ppx: Lovenox  #Diet: DASH  #Activity: IAT  #Dispo: Medicine            73 year old female PMHx HTN, HLD, DM Type II, Parkinson's, presents to the ED with increased pain in b/l legs for the last 2 weeks. She is on the max dose of Sinemet (2000mg/day) but has had worsening pain and muscle spasms. Has an appointment with Movement specialist in a few days.    # Worsening pain and muscle spasm likely 2/2 progression of Parkinson's  # hx Parkinson's 15 years  - pain and spasms currently well controlled >> possible on-off phenomena  - c/w Sinemet 1000/50 4x/day  - CPK, inflammatory markers, electrolytes wnl  - f/u venous duplex  - Neuro consulted  - trial of Baclofen starting with 5mg TID; up-titrate to 15mg TID if no side effects  - may increase Desvenlafaxine to 100mg/day (daughter will bring from home)  - f/u movement specialist out-patient     # HTN  - Bistolic 5mg     # HLD  - Atorvastatin 20mg     # DM  - home med: Metformin 500mg BID  - ISS, monitor FS, target 140-180    #DVT ppx: Lovenox  #Diet: DASH  #Activity: IAT  #Dispo: Medicine

## 2023-09-16 NOTE — H&P ADULT - ATTENDING COMMENTS
73 year old female PMHx HTN, HLD, DM Type II, Parkinson's, presents to the ED with increased pain in b/l legs for the last 2 weeks. She is on the max dose of Sinemet (2000mg/day) but has had worsening pain and muscle spasms. Has an appointment with Movement specialist in a few days. Vitals stable. Exam benign. Labs and imaging reviewed.    # Worsening pain and muscle spasm likely 2/2 progression of Parkinson's  # hx Parkinson's 15 years  - pain and spasms currently well controlled >> possible on-off phenomena  - c/w Sinemet 1000/50 4x/day  - CPK, inflammatory markers, electrolytes wnl  - f/u venous duplex  - Neuro consulted; follow their rec  - trial of Baclofen starting with 5mg TID; up-titrate to 15mg TID if no side effects  - may increase Desvenlafaxine to 100mg/day (daughter will bring from home)  - f/u movement specialist out-patient     # HTN  - Bistolic 5mg     # HLD  - Atorvastatin 20mg     # DM  - home med: Metformin 500mg BID  - ISS, monitor FS, target 140-180    #DVT ppx: Lovenox  #Diet: DASH  #Activity: IAT  #Dispo: Medicine

## 2023-09-17 LAB
ALBUMIN SERPL ELPH-MCNC: 4.5 G/DL — SIGNIFICANT CHANGE UP (ref 3.5–5.2)
ALP SERPL-CCNC: 69 U/L — SIGNIFICANT CHANGE UP (ref 30–115)
ALT FLD-CCNC: <5 U/L — SIGNIFICANT CHANGE UP (ref 0–41)
ANION GAP SERPL CALC-SCNC: 17 MMOL/L — HIGH (ref 7–14)
AST SERPL-CCNC: 16 U/L — SIGNIFICANT CHANGE UP (ref 0–41)
BASOPHILS # BLD AUTO: 0.04 K/UL — SIGNIFICANT CHANGE UP (ref 0–0.2)
BASOPHILS NFR BLD AUTO: 0.5 % — SIGNIFICANT CHANGE UP (ref 0–1)
BILIRUB SERPL-MCNC: 1 MG/DL — SIGNIFICANT CHANGE UP (ref 0.2–1.2)
BUN SERPL-MCNC: 29 MG/DL — HIGH (ref 10–20)
CALCIUM SERPL-MCNC: 9.3 MG/DL — SIGNIFICANT CHANGE UP (ref 8.4–10.5)
CHLORIDE SERPL-SCNC: 102 MMOL/L — SIGNIFICANT CHANGE UP (ref 98–110)
CO2 SERPL-SCNC: 20 MMOL/L — SIGNIFICANT CHANGE UP (ref 17–32)
CREAT SERPL-MCNC: 1 MG/DL — SIGNIFICANT CHANGE UP (ref 0.7–1.5)
EGFR: 60 ML/MIN/1.73M2 — SIGNIFICANT CHANGE UP
EOSINOPHIL # BLD AUTO: 0.05 K/UL — SIGNIFICANT CHANGE UP (ref 0–0.7)
EOSINOPHIL NFR BLD AUTO: 0.7 % — SIGNIFICANT CHANGE UP (ref 0–8)
GLUCOSE BLDC GLUCOMTR-MCNC: 101 MG/DL — HIGH (ref 70–99)
GLUCOSE BLDC GLUCOMTR-MCNC: 108 MG/DL — HIGH (ref 70–99)
GLUCOSE BLDC GLUCOMTR-MCNC: 124 MG/DL — HIGH (ref 70–99)
GLUCOSE BLDC GLUCOMTR-MCNC: 136 MG/DL — HIGH (ref 70–99)
GLUCOSE SERPL-MCNC: 101 MG/DL — HIGH (ref 70–99)
HCT VFR BLD CALC: 39.2 % — SIGNIFICANT CHANGE UP (ref 37–47)
HGB BLD-MCNC: 13.1 G/DL — SIGNIFICANT CHANGE UP (ref 12–16)
IMM GRANULOCYTES NFR BLD AUTO: 0.1 % — SIGNIFICANT CHANGE UP (ref 0.1–0.3)
LYMPHOCYTES # BLD AUTO: 2.12 K/UL — SIGNIFICANT CHANGE UP (ref 1.2–3.4)
LYMPHOCYTES # BLD AUTO: 27.6 % — SIGNIFICANT CHANGE UP (ref 20.5–51.1)
MAGNESIUM SERPL-MCNC: 2 MG/DL — SIGNIFICANT CHANGE UP (ref 1.8–2.4)
MCHC RBC-ENTMCNC: 30 PG — SIGNIFICANT CHANGE UP (ref 27–31)
MCHC RBC-ENTMCNC: 33.4 G/DL — SIGNIFICANT CHANGE UP (ref 32–37)
MCV RBC AUTO: 89.9 FL — SIGNIFICANT CHANGE UP (ref 81–99)
MONOCYTES # BLD AUTO: 0.59 K/UL — SIGNIFICANT CHANGE UP (ref 0.1–0.6)
MONOCYTES NFR BLD AUTO: 7.7 % — SIGNIFICANT CHANGE UP (ref 1.7–9.3)
NEUTROPHILS # BLD AUTO: 4.87 K/UL — SIGNIFICANT CHANGE UP (ref 1.4–6.5)
NEUTROPHILS NFR BLD AUTO: 63.4 % — SIGNIFICANT CHANGE UP (ref 42.2–75.2)
NRBC # BLD: 0 /100 WBCS — SIGNIFICANT CHANGE UP (ref 0–0)
PLATELET # BLD AUTO: 261 K/UL — SIGNIFICANT CHANGE UP (ref 130–400)
PMV BLD: 10.8 FL — HIGH (ref 7.4–10.4)
POTASSIUM SERPL-MCNC: 4.2 MMOL/L — SIGNIFICANT CHANGE UP (ref 3.5–5)
POTASSIUM SERPL-SCNC: 4.2 MMOL/L — SIGNIFICANT CHANGE UP (ref 3.5–5)
PROT SERPL-MCNC: 6.8 G/DL — SIGNIFICANT CHANGE UP (ref 6–8)
RBC # BLD: 4.36 M/UL — SIGNIFICANT CHANGE UP (ref 4.2–5.4)
RBC # FLD: 12.9 % — SIGNIFICANT CHANGE UP (ref 11.5–14.5)
SODIUM SERPL-SCNC: 139 MMOL/L — SIGNIFICANT CHANGE UP (ref 135–146)
WBC # BLD: 7.68 K/UL — SIGNIFICANT CHANGE UP (ref 4.8–10.8)
WBC # FLD AUTO: 7.68 K/UL — SIGNIFICANT CHANGE UP (ref 4.8–10.8)

## 2023-09-17 PROCEDURE — 93970 EXTREMITY STUDY: CPT | Mod: 26

## 2023-09-17 PROCEDURE — 99232 SBSQ HOSP IP/OBS MODERATE 35: CPT

## 2023-09-17 RX ORDER — LANOLIN ALCOHOL/MO/W.PET/CERES
5 CREAM (GRAM) TOPICAL AT BEDTIME
Refills: 0 | Status: DISCONTINUED | OUTPATIENT
Start: 2023-09-17 | End: 2023-09-19

## 2023-09-17 RX ORDER — CYCLOBENZAPRINE HYDROCHLORIDE 10 MG/1
5 TABLET, FILM COATED ORAL THREE TIMES A DAY
Refills: 0 | Status: DISCONTINUED | OUTPATIENT
Start: 2023-09-17 | End: 2023-09-18

## 2023-09-17 RX ADMIN — CHLORHEXIDINE GLUCONATE 1 APPLICATION(S): 213 SOLUTION TOPICAL at 05:20

## 2023-09-17 RX ADMIN — CARBIDOPA AND LEVODOPA 2 TABLET(S): 25; 100 TABLET ORAL at 14:52

## 2023-09-17 RX ADMIN — Medication 5 MILLIGRAM(S): at 05:20

## 2023-09-17 RX ADMIN — CARBIDOPA AND LEVODOPA 2 TABLET(S): 25; 100 TABLET ORAL at 10:40

## 2023-09-17 RX ADMIN — Medication 650 MILLIGRAM(S): at 06:48

## 2023-09-17 RX ADMIN — CYCLOBENZAPRINE HYDROCHLORIDE 5 MILLIGRAM(S): 10 TABLET, FILM COATED ORAL at 21:10

## 2023-09-17 RX ADMIN — ZOLPIDEM TARTRATE 5 MILLIGRAM(S): 10 TABLET ORAL at 21:10

## 2023-09-17 RX ADMIN — CARBIDOPA AND LEVODOPA 2 TABLET(S): 25; 100 TABLET ORAL at 17:23

## 2023-09-17 RX ADMIN — ENOXAPARIN SODIUM 40 MILLIGRAM(S): 100 INJECTION SUBCUTANEOUS at 17:24

## 2023-09-17 RX ADMIN — DESVENLAFAXINE 50 MILLIGRAM(S): 50 TABLET, EXTENDED RELEASE ORAL at 14:51

## 2023-09-17 RX ADMIN — CARBIDOPA AND LEVODOPA 2 TABLET(S): 25; 100 TABLET ORAL at 21:10

## 2023-09-17 RX ADMIN — Medication 650 MILLIGRAM(S): at 05:48

## 2023-09-18 LAB
A1C WITH ESTIMATED AVERAGE GLUCOSE RESULT: 6.7 % — HIGH (ref 4–5.6)
ALBUMIN SERPL ELPH-MCNC: 4.4 G/DL — SIGNIFICANT CHANGE UP (ref 3.5–5.2)
ALP SERPL-CCNC: 67 U/L — SIGNIFICANT CHANGE UP (ref 30–115)
ALT FLD-CCNC: <5 U/L — SIGNIFICANT CHANGE UP (ref 0–41)
ANION GAP SERPL CALC-SCNC: 12 MMOL/L — SIGNIFICANT CHANGE UP (ref 7–14)
AST SERPL-CCNC: 16 U/L — SIGNIFICANT CHANGE UP (ref 0–41)
BASOPHILS # BLD AUTO: 0.03 K/UL — SIGNIFICANT CHANGE UP (ref 0–0.2)
BASOPHILS NFR BLD AUTO: 0.5 % — SIGNIFICANT CHANGE UP (ref 0–1)
BILIRUB SERPL-MCNC: 0.9 MG/DL — SIGNIFICANT CHANGE UP (ref 0.2–1.2)
BUN SERPL-MCNC: 32 MG/DL — HIGH (ref 10–20)
CALCIUM SERPL-MCNC: 9.5 MG/DL — SIGNIFICANT CHANGE UP (ref 8.4–10.5)
CHLORIDE SERPL-SCNC: 103 MMOL/L — SIGNIFICANT CHANGE UP (ref 98–110)
CO2 SERPL-SCNC: 23 MMOL/L — SIGNIFICANT CHANGE UP (ref 17–32)
CREAT SERPL-MCNC: 0.9 MG/DL — SIGNIFICANT CHANGE UP (ref 0.7–1.5)
EGFR: 68 ML/MIN/1.73M2 — SIGNIFICANT CHANGE UP
EOSINOPHIL # BLD AUTO: 0.04 K/UL — SIGNIFICANT CHANGE UP (ref 0–0.7)
EOSINOPHIL NFR BLD AUTO: 0.7 % — SIGNIFICANT CHANGE UP (ref 0–8)
ESTIMATED AVERAGE GLUCOSE: 146 MG/DL — HIGH (ref 68–114)
GLUCOSE BLDC GLUCOMTR-MCNC: 110 MG/DL — HIGH (ref 70–99)
GLUCOSE BLDC GLUCOMTR-MCNC: 135 MG/DL — HIGH (ref 70–99)
GLUCOSE BLDC GLUCOMTR-MCNC: 173 MG/DL — HIGH (ref 70–99)
GLUCOSE BLDC GLUCOMTR-MCNC: 232 MG/DL — HIGH (ref 70–99)
GLUCOSE SERPL-MCNC: 107 MG/DL — HIGH (ref 70–99)
HCT VFR BLD CALC: 38.8 % — SIGNIFICANT CHANGE UP (ref 37–47)
HCV AB S/CO SERPL IA: 0.04 COI — SIGNIFICANT CHANGE UP
HCV AB SERPL-IMP: SIGNIFICANT CHANGE UP
HGB BLD-MCNC: 13 G/DL — SIGNIFICANT CHANGE UP (ref 12–16)
IMM GRANULOCYTES NFR BLD AUTO: 0.2 % — SIGNIFICANT CHANGE UP (ref 0.1–0.3)
LYMPHOCYTES # BLD AUTO: 1.91 K/UL — SIGNIFICANT CHANGE UP (ref 1.2–3.4)
LYMPHOCYTES # BLD AUTO: 33.5 % — SIGNIFICANT CHANGE UP (ref 20.5–51.1)
MAGNESIUM SERPL-MCNC: 2 MG/DL — SIGNIFICANT CHANGE UP (ref 1.8–2.4)
MCHC RBC-ENTMCNC: 30.8 PG — SIGNIFICANT CHANGE UP (ref 27–31)
MCHC RBC-ENTMCNC: 33.5 G/DL — SIGNIFICANT CHANGE UP (ref 32–37)
MCV RBC AUTO: 91.9 FL — SIGNIFICANT CHANGE UP (ref 81–99)
MONOCYTES # BLD AUTO: 0.45 K/UL — SIGNIFICANT CHANGE UP (ref 0.1–0.6)
MONOCYTES NFR BLD AUTO: 7.9 % — SIGNIFICANT CHANGE UP (ref 1.7–9.3)
NEUTROPHILS # BLD AUTO: 3.27 K/UL — SIGNIFICANT CHANGE UP (ref 1.4–6.5)
NEUTROPHILS NFR BLD AUTO: 57.2 % — SIGNIFICANT CHANGE UP (ref 42.2–75.2)
NRBC # BLD: 0 /100 WBCS — SIGNIFICANT CHANGE UP (ref 0–0)
PLATELET # BLD AUTO: 240 K/UL — SIGNIFICANT CHANGE UP (ref 130–400)
PMV BLD: 10.6 FL — HIGH (ref 7.4–10.4)
POTASSIUM SERPL-MCNC: 4.6 MMOL/L — SIGNIFICANT CHANGE UP (ref 3.5–5)
POTASSIUM SERPL-SCNC: 4.6 MMOL/L — SIGNIFICANT CHANGE UP (ref 3.5–5)
PROT SERPL-MCNC: 6.5 G/DL — SIGNIFICANT CHANGE UP (ref 6–8)
RBC # BLD: 4.22 M/UL — SIGNIFICANT CHANGE UP (ref 4.2–5.4)
RBC # FLD: 13.2 % — SIGNIFICANT CHANGE UP (ref 11.5–14.5)
SODIUM SERPL-SCNC: 138 MMOL/L — SIGNIFICANT CHANGE UP (ref 135–146)
WBC # BLD: 5.71 K/UL — SIGNIFICANT CHANGE UP (ref 4.8–10.8)
WBC # FLD AUTO: 5.71 K/UL — SIGNIFICANT CHANGE UP (ref 4.8–10.8)

## 2023-09-18 PROCEDURE — 72050 X-RAY EXAM NECK SPINE 4/5VWS: CPT | Mod: 26

## 2023-09-18 PROCEDURE — 72070 X-RAY EXAM THORAC SPINE 2VWS: CPT | Mod: 26

## 2023-09-18 PROCEDURE — 99232 SBSQ HOSP IP/OBS MODERATE 35: CPT

## 2023-09-18 PROCEDURE — 72110 X-RAY EXAM L-2 SPINE 4/>VWS: CPT | Mod: 26

## 2023-09-18 RX ORDER — BACLOFEN 100 %
5 POWDER (GRAM) MISCELLANEOUS EVERY 8 HOURS
Refills: 0 | Status: DISCONTINUED | OUTPATIENT
Start: 2023-09-18 | End: 2023-09-19

## 2023-09-18 RX ORDER — DESVENLAFAXINE 50 MG/1
50 TABLET, EXTENDED RELEASE ORAL DAILY
Refills: 0 | Status: DISCONTINUED | OUTPATIENT
Start: 2023-09-19 | End: 2023-09-19

## 2023-09-18 RX ORDER — METOPROLOL TARTRATE 50 MG
25 TABLET ORAL DAILY
Refills: 0 | Status: DISCONTINUED | OUTPATIENT
Start: 2023-09-18 | End: 2023-09-19

## 2023-09-18 RX ADMIN — Medication 1: at 12:14

## 2023-09-18 RX ADMIN — CARBIDOPA AND LEVODOPA 2 TABLET(S): 25; 100 TABLET ORAL at 12:13

## 2023-09-18 RX ADMIN — Medication 5 MILLIGRAM(S): at 16:50

## 2023-09-18 RX ADMIN — ENOXAPARIN SODIUM 40 MILLIGRAM(S): 100 INJECTION SUBCUTANEOUS at 18:08

## 2023-09-18 RX ADMIN — CHLORHEXIDINE GLUCONATE 1 APPLICATION(S): 213 SOLUTION TOPICAL at 05:31

## 2023-09-18 RX ADMIN — DESVENLAFAXINE 50 MILLIGRAM(S): 50 TABLET, EXTENDED RELEASE ORAL at 12:15

## 2023-09-18 RX ADMIN — CYCLOBENZAPRINE HYDROCHLORIDE 5 MILLIGRAM(S): 10 TABLET, FILM COATED ORAL at 15:34

## 2023-09-18 RX ADMIN — Medication 650 MILLIGRAM(S): at 23:52

## 2023-09-18 RX ADMIN — Medication 5 MILLIGRAM(S): at 23:52

## 2023-09-18 RX ADMIN — CARBIDOPA AND LEVODOPA 2 TABLET(S): 25; 100 TABLET ORAL at 17:59

## 2023-09-18 RX ADMIN — CARBIDOPA AND LEVODOPA 2 TABLET(S): 25; 100 TABLET ORAL at 15:33

## 2023-09-18 RX ADMIN — CYCLOBENZAPRINE HYDROCHLORIDE 5 MILLIGRAM(S): 10 TABLET, FILM COATED ORAL at 05:32

## 2023-09-18 RX ADMIN — Medication 5 MILLIGRAM(S): at 21:20

## 2023-09-18 RX ADMIN — CARBIDOPA AND LEVODOPA 2 TABLET(S): 25; 100 TABLET ORAL at 21:17

## 2023-09-18 NOTE — PHYSICAL THERAPY INITIAL EVALUATION ADULT - PERTINENT HX OF CURRENT PROBLEM, REHAB EVAL
73 year old female PMHx HTN, HLD, DM Type II, Parkinson's, presents to the ED with increased pain in b/l legs for the last 2 weeks. She is on the max dose of Sinemet (2000mg/day) but has had worsening pain and muscle spasms. Has an appointment with Movement specialist in a few days.

## 2023-09-18 NOTE — PHYSICAL THERAPY INITIAL EVALUATION ADULT - ADDITIONAL COMMENTS
Pt lives alone with HHA in an apartment with elevator access Pt lives alone with HHA in an apartment with elevator access.

## 2023-09-18 NOTE — PHYSICAL THERAPY INITIAL EVALUATION ADULT - GENERAL OBSERVATIONS, REHAB EVAL
8:40-9:05am Pt encountered in semi-woods position in bed, A & O x 4 in NAD, no c/o pain and agreeable to PT. Pt requires CGA in bed mobility, CGA transfer mobility and ambulated 100 ft CGA using RW with dec step-length/bob. Pt demonstrated unsteady gait/+shuffling during ambulation. Pt left seated in chair in NAD, +chair alarm, call bell. Pt will benefit from skilled PT 3-5x/wk for thera ex, transfer act, balance and gait training to improve mobility status.

## 2023-09-19 VITALS
RESPIRATION RATE: 18 BRPM | HEART RATE: 63 BPM | TEMPERATURE: 97 F | SYSTOLIC BLOOD PRESSURE: 156 MMHG | DIASTOLIC BLOOD PRESSURE: 76 MMHG | OXYGEN SATURATION: 98 %

## 2023-09-19 LAB
ALBUMIN SERPL ELPH-MCNC: 4.5 G/DL — SIGNIFICANT CHANGE UP (ref 3.5–5.2)
ALP SERPL-CCNC: 81 U/L — SIGNIFICANT CHANGE UP (ref 30–115)
ALT FLD-CCNC: <5 U/L — SIGNIFICANT CHANGE UP (ref 0–41)
ANION GAP SERPL CALC-SCNC: 12 MMOL/L — SIGNIFICANT CHANGE UP (ref 7–14)
AST SERPL-CCNC: 17 U/L — SIGNIFICANT CHANGE UP (ref 0–41)
BILIRUB SERPL-MCNC: 0.5 MG/DL — SIGNIFICANT CHANGE UP (ref 0.2–1.2)
BUN SERPL-MCNC: 35 MG/DL — HIGH (ref 10–20)
CALCIUM SERPL-MCNC: 9.4 MG/DL — SIGNIFICANT CHANGE UP (ref 8.4–10.5)
CHLORIDE SERPL-SCNC: 103 MMOL/L — SIGNIFICANT CHANGE UP (ref 98–110)
CO2 SERPL-SCNC: 23 MMOL/L — SIGNIFICANT CHANGE UP (ref 17–32)
CREAT SERPL-MCNC: 0.9 MG/DL — SIGNIFICANT CHANGE UP (ref 0.7–1.5)
EGFR: 68 ML/MIN/1.73M2 — SIGNIFICANT CHANGE UP
GLUCOSE BLDC GLUCOMTR-MCNC: 124 MG/DL — HIGH (ref 70–99)
GLUCOSE BLDC GLUCOMTR-MCNC: 167 MG/DL — HIGH (ref 70–99)
GLUCOSE SERPL-MCNC: 130 MG/DL — HIGH (ref 70–99)
HCT VFR BLD CALC: 38 % — SIGNIFICANT CHANGE UP (ref 37–47)
HGB BLD-MCNC: 12.5 G/DL — SIGNIFICANT CHANGE UP (ref 12–16)
MCHC RBC-ENTMCNC: 30 PG — SIGNIFICANT CHANGE UP (ref 27–31)
MCHC RBC-ENTMCNC: 32.9 G/DL — SIGNIFICANT CHANGE UP (ref 32–37)
MCV RBC AUTO: 91.3 FL — SIGNIFICANT CHANGE UP (ref 81–99)
NRBC # BLD: 0 /100 WBCS — SIGNIFICANT CHANGE UP (ref 0–0)
PLATELET # BLD AUTO: 246 K/UL — SIGNIFICANT CHANGE UP (ref 130–400)
PMV BLD: 10.8 FL — HIGH (ref 7.4–10.4)
POTASSIUM SERPL-MCNC: 4.1 MMOL/L — SIGNIFICANT CHANGE UP (ref 3.5–5)
POTASSIUM SERPL-SCNC: 4.1 MMOL/L — SIGNIFICANT CHANGE UP (ref 3.5–5)
PROT SERPL-MCNC: 6.5 G/DL — SIGNIFICANT CHANGE UP (ref 6–8)
RBC # BLD: 4.16 M/UL — LOW (ref 4.2–5.4)
RBC # FLD: 13.1 % — SIGNIFICANT CHANGE UP (ref 11.5–14.5)
SODIUM SERPL-SCNC: 138 MMOL/L — SIGNIFICANT CHANGE UP (ref 135–146)
WBC # BLD: 5.96 K/UL — SIGNIFICANT CHANGE UP (ref 4.8–10.8)
WBC # FLD AUTO: 5.96 K/UL — SIGNIFICANT CHANGE UP (ref 4.8–10.8)

## 2023-09-19 PROCEDURE — 99239 HOSP IP/OBS DSCHRG MGMT >30: CPT

## 2023-09-19 RX ORDER — ATORVASTATIN CALCIUM 80 MG/1
1 TABLET, FILM COATED ORAL
Refills: 0 | DISCHARGE

## 2023-09-19 RX ORDER — BACLOFEN 100 %
1 POWDER (GRAM) MISCELLANEOUS
Qty: 0 | Refills: 0 | DISCHARGE
Start: 2023-09-19

## 2023-09-19 RX ADMIN — DESVENLAFAXINE 50 MILLIGRAM(S): 50 TABLET, EXTENDED RELEASE ORAL at 11:13

## 2023-09-19 RX ADMIN — CARBIDOPA AND LEVODOPA 2 TABLET(S): 25; 100 TABLET ORAL at 14:56

## 2023-09-19 RX ADMIN — CHLORHEXIDINE GLUCONATE 1 APPLICATION(S): 213 SOLUTION TOPICAL at 05:36

## 2023-09-19 RX ADMIN — Medication 25 MILLIGRAM(S): at 05:37

## 2023-09-19 RX ADMIN — Medication 5 MILLIGRAM(S): at 13:09

## 2023-09-19 RX ADMIN — CARBIDOPA AND LEVODOPA 2 TABLET(S): 25; 100 TABLET ORAL at 11:12

## 2023-09-19 RX ADMIN — Medication 5 MILLIGRAM(S): at 05:36

## 2023-09-19 RX ADMIN — Medication 1: at 11:13

## 2023-09-19 NOTE — DISCHARGE NOTE PROVIDER - NSDCCPCAREPLAN_GEN_ALL_CORE_FT
PRINCIPAL DISCHARGE DIAGNOSIS  Diagnosis: Intractable pain  Assessment and Plan of Treatment: You were admitted for muscle spasms and imaging ruled out acute causes and trauma. After holding the statin medication your pain improved. Statins are known to cause myopathy, which is muscle pain. Consider switching to a hydrophillic statin medication (pravastatin) as these are less likely to induce myopathy. Please follow up with your PCP in 1 week to discuss different cholesterol treatment options. IF symptoms continue or worsen please return to the hospital.      SECONDARY DISCHARGE DIAGNOSES  Diagnosis: Muscle spasm  Assessment and Plan of Treatment:     Diagnosis: Parkinson's disease  Assessment and Plan of Treatment:

## 2023-09-19 NOTE — DISCHARGE NOTE PROVIDER - HOSPITAL COURSE
73 year old female PMHx HTN, HLD, DM Type II, Parkinson's, presents to the ED with increased pain in b/l legs for the last 2 weeks. She is on the max dose of Sinemet (2000mg/day) but has had worsening pain and muscle spasms.  She has an upcoming appointment with her movement specialist. Denies fevers, urinary/bowel incontinence, saddle anesthesia, chest pain, SOB, N/V/D. Currently pain and spasms are well controlled.    T(F): 99.7 HR: 98 BP: 143/86 RR: 18 SpO2: 95%  Labs unremarkable  U/A negative     You were admitted to medicine and seen by neurology and physical therapy. 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 Consider switching to mevacor or pravastatin for your cholesterol outpatient as these are less likely to induce myopathy.     # LE painful spasms - now better; hx Parkinson's 15 years  leg cramps could be from statin - HOLD statin  xray C/T/L spine showed degenerative changes  MRI - can be done as outpt if pain returns  c/w Baclofen 5mg po q8 (confusion could be from ambien)  d/c Flexeril  c/w Sinemet 1000/50 4x/day    # HTN  continue Bystolic 5mg     # HLD  HOLD Atorvastatin  as OUTPT, could try more water soluble statins: eg: mevacor or pravastatin    # DM  home med: Metformin 500mg BID  diabetic diet  FS goal 100-180    Patient is medically stable and will go home w/ outpt PT.

## 2023-09-19 NOTE — DISCHARGE NOTE PROVIDER - CARE PROVIDER_API CALL
Sandra Salas  Internal Medicine  [address] [place] [id number]  Phone: [phone]  Fax: [phone]  Established Patient  Follow Up Time: 1 week

## 2023-09-19 NOTE — PROGRESS NOTE ADULT - ASSESSMENT
73 year old female PMHx HTN, HLD, DM Type II, Parkinson's, presents to the ED with increased pain in b/l legs for the last 2 weeks. She is on the max dose of Sinemet (2000mg/day) but has had worsening pain and muscle spasms. Has an appointment with Movement specialist in a few days. Vitals stable. Exam benign. Labs and imaging reviewed.    # LE painful spasms   # Toxic encephalopathy 2/2 Baclofen   - hx Parkinson's 15 years  - relieved with Baclofen, however patient is confused today, suspect CNS effects from Baclofen in combination with Ambien   - will try Flexeril 5mg TID  - c/w Sinemet 1000/50 4x/day  - Neuro f/u   - PT/rehab    # HTN  - takes Bystolic 5mg     # HLD  - Atorvastatin 20mg     # DM  - home med: Metformin 500mg BID  - ISS, monitor FS, target 140-180    #DVT ppx: Lovenox      Pending: confusion resolution, PT/rehab    
73 year old woman PMHx HTN, HLD, DM Type II, Parkinson's, presents to the ED with increased pain in b/l legs for the last 2 weeks. She is on the max dose of Sinemet (2000mg/day) but has had worsening pain and muscle spasms. Has an appointment with Movement specialist in a few days. Vitals stable. Exam benign. Labs and imaging reviewed.    # LE painful spasms - now better; hx Parkinson's 15 years  leg cramps could be from statin - HOLD statin  neuro noted  xray C/T/L spine  pt does not want MRI; family wants pt to have MRI - can be done as outpt as pain is better  c/w Baclofen 5mg po q8 (confusion could be from ambien)  d/c Flexeril  c/w Sinemet 1000/50 4x/day  Neuro f/u   PT eval    # HTN  takes Bystolic 5mg   give toprol xl 25mg po q24 while here    # HLD  HOLD Atorvastatin  as OUTPT, could try more water soluble statins: eg: mevacor or pravastatin    # DM  home med: Metformin 500mg BID  diabetic diet  FS qac/hs - goal 100-180  SSI    # DVT ppx: Lovenox    # GI ppx: none    Dispo: xray C/T/L sp; hold statin; c/w baclofen;   eventually, pt will go home w/ outpt PT (doubt will need SNF) - anticipate d/c jessica    
73 year old woman PMHx HTN, HLD, DM Type II, Parkinson's, presents to the ED with increased pain in b/l legs for the last 2 weeks. She is on the max dose of Sinemet (2000mg/day) but has had worsening pain and muscle spasms. Has an appointment with Movement specialist in a few days. Vitals stable. Exam benign. Labs and imaging reviewed.    # LE painful spasms - now better; hx Parkinson's 15 years  leg cramps likely from statin - HOLD statin  neuro noted  xray C/T/L spine  pt does not want MRI; family wants pt to have MRI - can be done as outpt as pain is better (I don't think MRI is even needed)  c/w Baclofen 5mg po q8 - this can be tapered soon to off, as jennifer  (confusion was likely from ambien)  d/c Flexeril  c/w Sinemet 1000/50 4x/day  Neuro f/u outpt  PT eval    # HTN  takes Bystolic 5mg   give toprol xl 25mg po q24 while here    # HLD  HOLD Atorvastatin  as OUTPT, could try more water soluble statins: eg: mevacor or pravastatin    # DM  home med: Metformin 500mg BID  diabetic diet  FS qac/hs - goal 100-180  SSI    # DVT ppx: Lovenox    # GI ppx: none    Dispo: hold statin; c/w baclofen;   today, pt will go home w/ outpt PT    
73 year old female PMHx HTN, HLD, DM Type II, Parkinson's, presents to the ED with increased pain in b/l legs for the last 2 weeks. She is on the max dose of Sinemet (2000mg/day) but has had worsening pain and muscle spasms. Has an appointment with Movement specialist in a few days. Vitals stable. Exam benign. Labs and imaging reviewed.    # LE painful spasms   # Neuropathy  # Toxic encephalopathy 2/2 Baclofen   - hx Parkinson's 15 years  - relieved with Baclofen, however patient is confused today, suspect CNS effects from Baclofen in combination with Ambien   - holding ambien (9/18)  - started Flexeril 5mg TID (9/17)  - c/w Sinemet 1000/50 4x/day  - Neuro f/u   - PT/rehab: home PT with assistance    # HTN  - takes Bystolic 5mg     # HLD  - Atorvastatin 20mg     # DM  - home med: Metformin 500mg BID  - ISS, monitor FS, target 140-180    #DVT ppx: Lovenox      Pending: discharge planning  Discussed with daughter, they want MRI done before patient leaves because of the acute back pain, and hand/feet pain at night preventing her from sleeping  Dispo: home PT with assistance

## 2023-09-19 NOTE — DISCHARGE NOTE NURSING/CASE MANAGEMENT/SOCIAL WORK - NSDCPEFALRISK_GEN_ALL_CORE
For information on Fall & Injury Prevention, visit: [email or web address] OR  [email or web address] OR  https://www.cdc.gov/steadi/patient.html

## 2023-09-19 NOTE — DISCHARGE NOTE PROVIDER - NSDCMRMEDTOKEN_GEN_ALL_CORE_FT
baclofen 5 mg oral tablet: 1 tab(s) orally every 8 hours  Bystolic 5 mg oral tablet: 1 orally once a day  desvenlafaxine (as base) 50 mg oral tablet, extended release: 50 orally once a day  metFORMIN 500 mg oral tablet: 1 orally 2 times a day  Sinemet 25 mg-250 mg oral tablet: 2 cap(s) orally 4 times a day  zolpidem 10 mg oral tablet: 1 orally once a day (at bedtime)

## 2023-09-19 NOTE — PROGRESS NOTE ADULT - SUBJECTIVE AND OBJECTIVE BOX
HIPOLITO ESCAMILLA  73y  Female  ***My note supersedes ALL resident notes that I sign.  My corrections for their notes are in my note.***    I can be reached directly on [email or web address]. My office number is [phone]. My personal cell number is [phone].    French Trans: Zi GILLETTE    INTERVAL EVENTS: Here for f/u of musc cramps. PT says pains are much better. RN reports MS change after ambien, which now seems better.     T(F): 97 (09-18-23 @ 19:39), Max: 97.8 (09-17-23 @ 20:48)  HR: 88 (09-18-23 @ 19:39) (63 - 97)  BP: 104/67 (09-18-23 @ 19:39) (104/67 - 172/81)  RR: 18 (09-18-23 @ 19:39) (18 - 20)  SpO2: --    Gen: NAD  HEENT: PERRL, EOMI, mouth clr, nose clr  Neck: no nodes, no JVD, thyroid nl  lungs: clr  hrt: s1 s2 rrr no murmur  abd: soft, NT/ND, no HS megaly  ext: no edema, no c/c  neuro: aa ox3, cn intact, can move all 4 ext; no tremor    LABS:                      13.0    (    91.9   5.71  )-----------( ---------      240      ( 18 Sep 2023 08:14 )             38.8    (    13.2     138   (   103   (   107      09-18-23 @ 08:14  ----------------------               4.6   (   23   (   32                             -----                        0.9  Ca  9.5   Mg  2.0    P   --     LFT  6.5  (  0.9  (  16       09-18-23 @ 08:14  -------------------------  4.4  (  67  (  <5    Urinalysis Basic - ( 18 Sep 2023 08:14 )    Color: x / Appearance: x / SG: x / pH: x  Gluc: 107 mg/dL / Ketone: x  / Bili: x / Urobili: x   Blood: x / Protein: x / Nitrite: x   Leuk Esterase: x / RBC: x / WBC x   Sq Epi: x / Non Sq Epi: x / Bacteria: x    CAPILLARY BLOOD GLUCOSE  POCT Blood Glucose.: 135 (09-18-23 @ 17:33)  POCT Blood Glucose.: 173 (09-18-23 @ 11:42)  POCT Blood Glucose.: 110 (09-18-23 @ 08:02)  POCT Blood Glucose.: 108 (09-17-23 @ 21:09)  POCT Blood Glucose.: 124 (09-17-23 @ 16:56)  POCT Blood Glucose.: 136 (09-17-23 @ 11:32)  POCT Blood Glucose.: 101 (09-17-23 @ 07:52)  POCT Blood Glucose.: 111 (09-16-23 @ 22:43)    RADIOLOGY & ADDITIONAL TESTS:  < from: [facility] Duplex Lower Ext Vein Scan, Bilat (09.17.23 @ 09:23) >  Impression:    No evidence of deep venous thrombosis or superficial thrombophlebitis in   the bilateral lower extremities.    < end of copied text >    MEDICATIONS:    acetaminophen     Tablet .. 650 milliGRAM(s) Oral every 6 hours PRN  baclofen 5 milliGRAM(s) Oral every 8 hours  carbidopa/levodopa  25/250 2 Tablet(s) Oral <User Schedule>  chlorhexidine 2% Cloths 1 Application(s) Topical <User Schedule>  cyclobenzaprine 5 milliGRAM(s) Oral three times a day  enoxaparin Injectable 40 milliGRAM(s) SubCutaneous every 24 hours  insulin lispro (ADMELOG) corrective regimen sliding scale   SubCutaneous three times a day before meals  melatonin 5 milliGRAM(s) Oral at bedtime PRN  
   Pt seen and examined at bedside. Alert, confused. Denies any back or leg pain.       VITAL SIGNS (Last 24 hrs):  T(C): 35.9 (09-17-23 @ 12:53), Max: 37 (09-16-23 @ 19:33)  HR: 80 (09-17-23 @ 12:53) (80 - 85)  BP: 127/60 (09-17-23 @ 12:53) (118/58 - 145/87)  RR: 18 (09-17-23 @ 12:53) (18 - 18)  SpO2: --  Wt(kg): --  Daily     Daily     I&O's Summary      PHYSICAL EXAM:  GENERAL: NAD  HEAD:  Atraumatic, Normocephalic  EYES: conjunctiva and sclera clear  NECK: Supple, No JVD  CHEST/LUNG: Clear to auscultation bilaterally; No wheeze  HEART: Regular rate and rhythm; No murmurs, rubs, or gallops  ABDOMEN: Soft, Nontender, Nondistended; Bowel sounds present  EXTREMITIES:  2+ Peripheral Pulses, No clubbing, cyanosis, or edema  NEUROLOGY: alert, awake, confused, moving all ext spontaneously, b/l resting tremors  SKIN: No rashes or lesions    Labs Reviewed       CBC Full  -  ( 17 Sep 2023 08:31 )  WBC Count : 7.68 K/uL  Hemoglobin : 13.1 g/dL  Hematocrit : 39.2 %  Platelet Count - Automated : 261 K/uL  Mean Cell Volume : 89.9 fL  Mean Cell Hemoglobin : 30.0 pg  Mean Cell Hemoglobin Concentration : 33.4 g/dL  Auto Neutrophil # : 4.87 K/uL  Auto Lymphocyte # : 2.12 K/uL  Auto Monocyte # : 0.59 K/uL  Auto Eosinophil # : 0.05 K/uL  Auto Basophil # : 0.04 K/uL  Auto Neutrophil % : 63.4 %  Auto Lymphocyte % : 27.6 %  Auto Monocyte % : 7.7 %  Auto Eosinophil % : 0.7 %  Auto Basophil % : 0.5 %    BMP:    09-17 @ 08:31    Blood Urea Nitrogen - 29  Calcium - 9.3  Carbond Dioxide - 20  Chloride - 102  Creatinine - 1.0  Glucose - 101  Potassium - 4.2  Sodium - 139      Hemoglobin A1c -     Urine Culture:            MEDICATIONS  (STANDING):  carbidopa/levodopa  25/250 2 Tablet(s) Oral <User Schedule>  chlorhexidine 2% Cloths 1 Application(s) Topical <User Schedule>  desvenlafaxine ER 50 milliGRAM(s) Oral daily  dextrose 5%. 1000 milliLiter(s) (100 mL/Hr) IV Continuous <Continuous>  dextrose 5%. 1000 milliLiter(s) (50 mL/Hr) IV Continuous <Continuous>  dextrose 50% Injectable 25 Gram(s) IV Push once  dextrose 50% Injectable 12.5 Gram(s) IV Push once  dextrose 50% Injectable 25 Gram(s) IV Push once  enoxaparin Injectable 40 milliGRAM(s) SubCutaneous every 24 hours  glucagon  Injectable 1 milliGRAM(s) IntraMuscular once  insulin lispro (ADMELOG) corrective regimen sliding scale   SubCutaneous three times a day before meals    MEDICATIONS  (PRN):  acetaminophen     Tablet .. 650 milliGRAM(s) Oral every 6 hours PRN Temp greater or equal to 38C (100.4F), Mild Pain (1 - 3), Moderate Pain (4 - 6)  dextrose Oral Gel 15 Gram(s) Oral once PRN Blood Glucose LESS THAN 70 milliGRAM(s)/deciliter  zolpidem 5 milliGRAM(s) Oral at bedtime PRN Insomnia  zolpidem 5 milliGRAM(s) Oral at bedtime PRN Insomnia  
  HIPOLITO ESCAMILLA  73y  Female  ***My note supersedes ALL resident notes that I sign.  My corrections for their notes are in my note.***    I can be reached directly on [employer] [id number]. My office number is [phone]. My personal cell number is [phone].    Italian Trans: LETA Sam    INTERVAL EVENTS: Here for f/u of calf pain. This is better w/ holding statin. Pt feels OK to go home.    T(F): 96.8 (09-19-23 @ 12:30), Max: 97.2 (09-19-23 @ 05:33)  HR: 63 (09-19-23 @ 12:30) (63 - 88)  BP: 156/76 (09-19-23 @ 12:30) (104/67 - 156/76)  RR: 18 (09-19-23 @ 12:30) (18 - 18)  SpO2: 98% (09-19-23 @ 12:30) (98% - 98%)    Gen: NAD  HEENT: PERRL, EOMI, mouth clr, nose clr  Neck: no nodes, no JVD, thyroid nl  lungs: clr  hrt: s1 s2 rrr no murmur  abd: soft, NT/ND, no HS megaly  ext: no edema, no c/c  neuro: aa ox3, cn intact, can move all 4 ext; no tremor    LABS:                      12.5    (    91.3   5.96  )-----------( ---------      246      ( 19 Sep 2023 07:44 )             38.0    (    13.1     138   (   103   (   130      09-19-23 @ 07:44  ----------------------               4.1   (   23   (   35                             -----                        0.9  Ca  9.4   Mg  --    P   --     LFT  6.5  (  0.5  (  17       09-19-23 @ 07:44  -------------------------  4.5  (  81  (  <5    Urinalysis Basic - ( 19 Sep 2023 07:44 )    Color: x / Appearance: x / SG: x / pH: x  Gluc: 130 mg/dL / Ketone: x  / Bili: x / Urobili: x   Blood: x / Protein: x / Nitrite: x   Leuk Esterase: x / RBC: x / WBC x   Sq Epi: x / Non Sq Epi: x / Bacteria: x    RADIOLOGY & ADDITIONAL TESTS:  < from: Xray Lumbosacral Spine (09.18.23 @ 23:24) >  IMPRESSION:  No acute osseous abnormality seen. Degenerative changes throughout,   severe in the lower lumbar spine.    < end of copied text >      MEDICATIONS:    acetaminophen     Tablet .. 650 milliGRAM(s) Oral every 6 hours PRN  baclofen 5 milliGRAM(s) Oral every 8 hours  carbidopa/levodopa  25/250 2 Tablet(s) Oral <User Schedule>  chlorhexidine 2% Cloths 1 Application(s) Topical <User Schedule>  desvenlafaxine ER 50 milliGRAM(s) Oral daily  enoxaparin Injectable 40 milliGRAM(s) SubCutaneous every 24 hours  glucagon  Injectable 1 milliGRAM(s) IntraMuscular once  insulin lispro (ADMELOG) corrective regimen sliding scale   SubCutaneous three times a day before meals  melatonin 5 milliGRAM(s) Oral at bedtime PRN  metoprolol succinate ER 25 milliGRAM(s) Oral daily  
24H events:    Patient is a 73y old Female who presents with a chief complaint of   Primary diagnosis of Intractable pain    Today is hospital day 2d. This morning patient was seen and examined at bedside, resting comfortably in bed.    No acute or major events overnight.    Code Status:    Family communication:  Contact date:  Name of person contacted:  Relationship to patient:  Communication details:  What matters most:    PAST MEDICAL & SURGICAL HISTORY  Parkinsons    Hypertension    Hyperlipidemia    Diabetes      SOCIAL HISTORY:  Social History:  lives at home with daughter  denies illicit drug use , alcohol, smoking (16 Sep 2023 11:37)      ALLERGIES:  No Known Allergies    MEDICATIONS:  STANDING MEDICATIONS  baclofen 5 milliGRAM(s) Oral every 8 hours  carbidopa/levodopa  25/250 2 Tablet(s) Oral <User Schedule>  chlorhexidine 2% Cloths 1 Application(s) Topical <User Schedule>  cyclobenzaprine 5 milliGRAM(s) Oral three times a day  dextrose 5%. 1000 milliLiter(s) IV Continuous <Continuous>  dextrose 5%. 1000 milliLiter(s) IV Continuous <Continuous>  dextrose 50% Injectable 25 Gram(s) IV Push once  dextrose 50% Injectable 25 Gram(s) IV Push once  dextrose 50% Injectable 12.5 Gram(s) IV Push once  enoxaparin Injectable 40 milliGRAM(s) SubCutaneous every 24 hours  glucagon  Injectable 1 milliGRAM(s) IntraMuscular once  insulin lispro (ADMELOG) corrective regimen sliding scale   SubCutaneous three times a day before meals    PRN MEDICATIONS  acetaminophen     Tablet .. 650 milliGRAM(s) Oral every 6 hours PRN  dextrose Oral Gel 15 Gram(s) Oral once PRN  melatonin 5 milliGRAM(s) Oral at bedtime PRN    VITALS:   T(F): 97.8  HR: 82  BP: 121/70  RR: 18  SpO2: --    PHYSICAL EXAM:  GENERAL:   ( + ) NAD, lying in bed comfortably     (  ) obtunded     (  ) lethargic     (  ) somnolent    HEAD:   ( + ) Atraumatic     (  ) hematoma     (  ) laceration (specify location:       )     NECK:  ( + ) Supple     (  ) neck stiffness     (  ) nuchal rigidity     (  )  no JVD     (  ) JVD present ( -- cm)    HEART:  Rate -->     ( + ) normal rate     (  ) bradycardic     (  ) tachycardic  Rhythm -->     ( + ) regular     (  ) regularly irregular     (  ) irregularly irregular  Murmurs -->     ( + ) normal s1s2     (  ) systolic murmur     (  ) diastolic murmur     (  ) continuous murmur      (  ) S3 present     (  ) S4 present    LUNGS:   ( + )Unlabored respirations     (  ) tachypnea  ( + ) B/L air entry     (  ) decreased breath sounds in:  (location     )    ( + ) no adventitious sound     (  ) crackles     (  ) wheezing      (  ) rhonchi      (specify location:       )  (  ) chest wall tenderness (specify location:       )    ABDOMEN:   ( + ) Soft     (  ) tense   |   ( + ) nondistended     (  ) distended   |   (  ) +BS     (  ) hypoactive bowel sounds     (  ) hyperactive bowel sounds  ( + ) nontender     (  ) RUQ tenderness     (  ) RLQ tenderness     (  ) LLQ tenderness     (  ) epigastric tenderness     (  ) diffuse tenderness  (  ) Splenomegaly      (  ) Hepatomegaly      (  ) Jaundice     (  ) ecchymosis     EXTREMITIES: 2+ peripheral pulses bilaterally. No clubbing, cyanosis, or edema  ( + ) Normal     (  ) Rash     (  ) ecchymosis     (  ) varicose veins      (  ) pitting edema     (  ) non-pitting edema   (  ) ulceration     (  ) gangrene:     (location:     )    NERVOUS SYSTEM:    ( + ) A&Ox3     (  ) confused     (  ) lethargic  CN II-XII:     ( + ) Intact     (  ) deficits found     (Specify:     )   Upper extremities:     (  ) no sensorimotor deficits     (  ) weakness     (  ) loss of proprioception/vibration     (  ) loss of touch/temperature (specify:    )  Lower extremities:     (  ) no sensorimotor deficits     (  ) weakness     (  ) loss of proprioception/vibration     (  ) loss of touch/temperature (specify:    )    SKIN:   (  ) No rashes or lesions     (  ) maculopapular rash     (  ) pustules     (  ) vesicles     (  ) ulcer     (  ) ecchymosis     (specify location:     )    AMPAC score:    (  ) Indwelling Jhaveri Catheter:   Date insterted:    Reason (  ) Critical illness     (  ) urinary retention    (  ) Accurate Ins/Outs Monitoring     (  ) CMO patient    (  ) Central Line:   Date inserted:  Location: (  ) Right IJ     (  ) Left IJ     (  ) Right Fem     (  ) Left Fem    (  ) SPC        (  ) pigtail       (  ) PEG tube       (  ) colostomy       (  ) jejunostomy  (  ) U-Dall    LABS:                        13.0   5.71  )-----------( 240      ( 18 Sep 2023 08:14 )             38.8     09-18    138  |  103  |  32<H>  ----------------------------<  107<H>  4.6   |  23  |  0.9    Ca    9.5      18 Sep 2023 08:14  Mg     2.0     09-18    TPro  6.5  /  Alb  4.4  /  TBili  0.9  /  DBili  x   /  AST  16  /  ALT  <5  /  AlkPhos  67  09-18      Urinalysis Basic - ( 18 Sep 2023 08:14 )    Color: x / Appearance: x / SG: x / pH: x  Gluc: 107 mg/dL / Ketone: x  / Bili: x / Urobili: x   Blood: x / Protein: x / Nitrite: x   Leuk Esterase: x / RBC: x / WBC x   Sq Epi: x / Non Sq Epi: x / Bacteria: x                RADIOLOGY:

## 2023-09-19 NOTE — CHART NOTE - NSCHARTNOTEFT_GEN_A_CORE
X-Ray C/T/L-spine reviewed. No acute osseous abnormality seen. Degenerative changes throughout, severe in the lower lumbar spine. Continue home dose of Sinemet. Continue Baclofen 5 mg q8hrs. Okay to follow up with Neurology outpatient. Neurology will sign off, please call for any changes in patient's neurological or clinical status.

## 2023-09-20 LAB
ALDOLASE SERPL-CCNC: 10 U/L — SIGNIFICANT CHANGE UP (ref 3.3–10.3)
ALDOLASE SERPL-CCNC: 5.7 U/L — SIGNIFICANT CHANGE UP (ref 3.3–10.3)

## 2023-09-22 DIAGNOSIS — E78.5 HYPERLIPIDEMIA, UNSPECIFIED: ICD-10-CM

## 2023-09-22 DIAGNOSIS — E11.9 TYPE 2 DIABETES MELLITUS WITHOUT COMPLICATIONS: ICD-10-CM

## 2023-09-22 DIAGNOSIS — G72.0 DRUG-INDUCED MYOPATHY: ICD-10-CM

## 2023-09-22 DIAGNOSIS — T50.905A ADVERSE EFFECT OF UNSPECIFIED DRUGS, MEDICAMENTS AND BIOLOGICAL SUBSTANCES, INITIAL ENCOUNTER: ICD-10-CM

## 2023-09-22 DIAGNOSIS — I10 ESSENTIAL (PRIMARY) HYPERTENSION: ICD-10-CM

## 2023-09-22 DIAGNOSIS — T46.6X5A ADVERSE EFFECT OF ANTIHYPERLIPIDEMIC AND ANTIARTERIOSCLEROTIC DRUGS, INITIAL ENCOUNTER: ICD-10-CM

## 2023-09-22 DIAGNOSIS — G92.9 UNSPECIFIED TOXIC ENCEPHALOPATHY: ICD-10-CM

## 2023-09-22 DIAGNOSIS — Z79.84 LONG TERM (CURRENT) USE OF ORAL HYPOGLYCEMIC DRUGS: ICD-10-CM

## 2023-09-22 DIAGNOSIS — G20 PARKINSON'S DISEASE: ICD-10-CM

## 2025-02-13 ENCOUNTER — INPATIENT (INPATIENT)
Facility: HOSPITAL | Age: 75
LOS: 6 days | Discharge: NURSING, REHAB OR LONG-TERM CARE | DRG: 175 | End: 2025-02-20
Attending: STUDENT IN AN ORGANIZED HEALTH CARE EDUCATION/TRAINING PROGRAM | Admitting: STUDENT IN AN ORGANIZED HEALTH CARE EDUCATION/TRAINING PROGRAM
Payer: MEDICARE

## 2025-02-13 VITALS
SYSTOLIC BLOOD PRESSURE: 105 MMHG | OXYGEN SATURATION: 95 % | HEART RATE: 94 BPM | TEMPERATURE: 98 F | WEIGHT: 145.06 LBS | HEIGHT: 61 IN | DIASTOLIC BLOOD PRESSURE: 72 MMHG | RESPIRATION RATE: 18 BRPM

## 2025-02-13 DIAGNOSIS — I26.99 OTHER PULMONARY EMBOLISM WITHOUT ACUTE COR PULMONALE: ICD-10-CM

## 2025-02-13 PROBLEM — E78.5 HYPERLIPIDEMIA, UNSPECIFIED: Chronic | Status: ACTIVE | Noted: 2023-09-16

## 2025-02-13 PROBLEM — I10 ESSENTIAL (PRIMARY) HYPERTENSION: Chronic | Status: ACTIVE | Noted: 2023-09-16

## 2025-02-13 PROBLEM — E11.9 TYPE 2 DIABETES MELLITUS WITHOUT COMPLICATIONS: Chronic | Status: ACTIVE | Noted: 2023-09-16

## 2025-02-13 PROBLEM — G20 PARKINSON'S DISEASE: Chronic | Status: ACTIVE | Noted: 2023-09-16

## 2025-02-13 LAB
ALBUMIN SERPL ELPH-MCNC: 3.6 G/DL — SIGNIFICANT CHANGE UP (ref 3.5–5.2)
ALP SERPL-CCNC: 61 U/L — SIGNIFICANT CHANGE UP (ref 30–115)
ALT FLD-CCNC: <5 U/L — SIGNIFICANT CHANGE UP (ref 0–41)
ANION GAP SERPL CALC-SCNC: 11 MMOL/L — SIGNIFICANT CHANGE UP (ref 7–14)
APPEARANCE UR: CLEAR — SIGNIFICANT CHANGE UP
APTT BLD: 30.2 SEC — SIGNIFICANT CHANGE UP (ref 27–39.2)
AST SERPL-CCNC: 9 U/L — SIGNIFICANT CHANGE UP (ref 0–41)
BASOPHILS # BLD AUTO: 0.02 K/UL — SIGNIFICANT CHANGE UP (ref 0–0.2)
BASOPHILS NFR BLD AUTO: 0.5 % — SIGNIFICANT CHANGE UP (ref 0–1)
BILIRUB SERPL-MCNC: 0.9 MG/DL — SIGNIFICANT CHANGE UP (ref 0.2–1.2)
BILIRUB UR-MCNC: ABNORMAL
BUN SERPL-MCNC: 33 MG/DL — HIGH (ref 10–20)
CALCIUM SERPL-MCNC: 8.2 MG/DL — LOW (ref 8.4–10.5)
CHLORIDE SERPL-SCNC: 96 MMOL/L — LOW (ref 98–110)
CO2 SERPL-SCNC: 24 MMOL/L — SIGNIFICANT CHANGE UP (ref 17–32)
COLOR SPEC: SIGNIFICANT CHANGE UP
CREAT SERPL-MCNC: 0.8 MG/DL — SIGNIFICANT CHANGE UP (ref 0.7–1.5)
DIFF PNL FLD: NEGATIVE — SIGNIFICANT CHANGE UP
EGFR: 78 ML/MIN/1.73M2 — SIGNIFICANT CHANGE UP
EOSINOPHIL # BLD AUTO: 0.02 K/UL — SIGNIFICANT CHANGE UP (ref 0–0.7)
EOSINOPHIL NFR BLD AUTO: 0.5 % — SIGNIFICANT CHANGE UP (ref 0–8)
GLUCOSE SERPL-MCNC: 96 MG/DL — SIGNIFICANT CHANGE UP (ref 70–99)
GLUCOSE UR QL: NEGATIVE MG/DL — SIGNIFICANT CHANGE UP
HCT VFR BLD CALC: 36.1 % — LOW (ref 37–47)
HGB BLD-MCNC: 12.4 G/DL — SIGNIFICANT CHANGE UP (ref 12–16)
IMM GRANULOCYTES NFR BLD AUTO: 0.7 % — HIGH (ref 0.1–0.3)
INR BLD: 1.04 RATIO — SIGNIFICANT CHANGE UP (ref 0.65–1.3)
KETONES UR-MCNC: 15 MG/DL
LACTATE SERPL-SCNC: 1.5 MMOL/L — SIGNIFICANT CHANGE UP (ref 0.7–2)
LEUKOCYTE ESTERASE UR-ACNC: ABNORMAL
LIDOCAIN IGE QN: 40 U/L — SIGNIFICANT CHANGE UP (ref 7–60)
LYMPHOCYTES # BLD AUTO: 1.43 K/UL — SIGNIFICANT CHANGE UP (ref 1.2–3.4)
LYMPHOCYTES # BLD AUTO: 32.4 % — SIGNIFICANT CHANGE UP (ref 20.5–51.1)
MCHC RBC-ENTMCNC: 30.5 PG — SIGNIFICANT CHANGE UP (ref 27–31)
MCHC RBC-ENTMCNC: 34.3 G/DL — SIGNIFICANT CHANGE UP (ref 32–37)
MCV RBC AUTO: 88.7 FL — SIGNIFICANT CHANGE UP (ref 81–99)
MONOCYTES # BLD AUTO: 0.36 K/UL — SIGNIFICANT CHANGE UP (ref 0.1–0.6)
MONOCYTES NFR BLD AUTO: 8.1 % — SIGNIFICANT CHANGE UP (ref 1.7–9.3)
NEUTROPHILS # BLD AUTO: 2.56 K/UL — SIGNIFICANT CHANGE UP (ref 1.4–6.5)
NEUTROPHILS NFR BLD AUTO: 57.8 % — SIGNIFICANT CHANGE UP (ref 42.2–75.2)
NITRITE UR-MCNC: NEGATIVE — SIGNIFICANT CHANGE UP
NRBC BLD AUTO-RTO: 0 /100 WBCS — SIGNIFICANT CHANGE UP (ref 0–0)
NT-PROBNP SERPL-SCNC: 161 PG/ML — SIGNIFICANT CHANGE UP (ref 0–300)
PH UR: 5.5 — SIGNIFICANT CHANGE UP (ref 5–8)
PLATELET # BLD AUTO: 243 K/UL — SIGNIFICANT CHANGE UP (ref 130–400)
PMV BLD: 11.9 FL — HIGH (ref 7.4–10.4)
POTASSIUM SERPL-MCNC: 3.8 MMOL/L — SIGNIFICANT CHANGE UP (ref 3.5–5)
POTASSIUM SERPL-SCNC: 3.8 MMOL/L — SIGNIFICANT CHANGE UP (ref 3.5–5)
PROT SERPL-MCNC: 5.4 G/DL — LOW (ref 6–8)
PROT UR-MCNC: 30 MG/DL
PROTHROM AB SERPL-ACNC: 12.3 SEC — SIGNIFICANT CHANGE UP (ref 9.95–12.87)
RBC # BLD: 4.07 M/UL — LOW (ref 4.2–5.4)
RBC # FLD: 13.1 % — SIGNIFICANT CHANGE UP (ref 11.5–14.5)
SODIUM SERPL-SCNC: 131 MMOL/L — LOW (ref 135–146)
SP GR SPEC: >1.03 — HIGH (ref 1–1.03)
TROPONIN T, HIGH SENSITIVITY RESULT: 14 NG/L — HIGH (ref 6–13)
UROBILINOGEN FLD QL: 1 MG/DL — SIGNIFICANT CHANGE UP (ref 0.2–1)
WBC # BLD: 4.42 K/UL — LOW (ref 4.8–10.8)
WBC # FLD AUTO: 4.42 K/UL — LOW (ref 4.8–10.8)

## 2025-02-13 PROCEDURE — 71275 CT ANGIOGRAPHY CHEST: CPT | Mod: 26

## 2025-02-13 PROCEDURE — 93970 EXTREMITY STUDY: CPT

## 2025-02-13 PROCEDURE — 93306 TTE W/DOPPLER COMPLETE: CPT

## 2025-02-13 PROCEDURE — 97161 PT EVAL LOW COMPLEX 20 MIN: CPT | Mod: GP

## 2025-02-13 PROCEDURE — 87086 URINE CULTURE/COLONY COUNT: CPT

## 2025-02-13 PROCEDURE — 70450 CT HEAD/BRAIN W/O DYE: CPT | Mod: 26

## 2025-02-13 PROCEDURE — 71045 X-RAY EXAM CHEST 1 VIEW: CPT | Mod: 26

## 2025-02-13 PROCEDURE — 95714 VEEG EA 12-26 HR UNMNTR: CPT

## 2025-02-13 PROCEDURE — 93308 TTE F-UP OR LMTD: CPT | Mod: 26

## 2025-02-13 PROCEDURE — 36415 COLL VENOUS BLD VENIPUNCTURE: CPT

## 2025-02-13 PROCEDURE — 84484 ASSAY OF TROPONIN QUANT: CPT

## 2025-02-13 PROCEDURE — 95819 EEG AWAKE AND ASLEEP: CPT

## 2025-02-13 PROCEDURE — 93005 ELECTROCARDIOGRAM TRACING: CPT

## 2025-02-13 PROCEDURE — 80053 COMPREHEN METABOLIC PANEL: CPT

## 2025-02-13 PROCEDURE — 82962 GLUCOSE BLOOD TEST: CPT

## 2025-02-13 PROCEDURE — 95700 EEG CONT REC W/VID EEG TECH: CPT

## 2025-02-13 PROCEDURE — 80048 BASIC METABOLIC PNL TOTAL CA: CPT

## 2025-02-13 PROCEDURE — 97116 GAIT TRAINING THERAPY: CPT | Mod: GP

## 2025-02-13 PROCEDURE — 83735 ASSAY OF MAGNESIUM: CPT

## 2025-02-13 PROCEDURE — 74177 CT ABD & PELVIS W/CONTRAST: CPT | Mod: 26

## 2025-02-13 PROCEDURE — 81003 URINALYSIS AUTO W/O SCOPE: CPT

## 2025-02-13 PROCEDURE — 83036 HEMOGLOBIN GLYCOSYLATED A1C: CPT

## 2025-02-13 PROCEDURE — 99285 EMERGENCY DEPT VISIT HI MDM: CPT

## 2025-02-13 PROCEDURE — 85025 COMPLETE CBC W/AUTO DIFF WBC: CPT

## 2025-02-13 RX ORDER — CEFTRIAXONE 500 MG/1
1000 INJECTION, POWDER, FOR SOLUTION INTRAMUSCULAR; INTRAVENOUS ONCE
Refills: 0 | Status: COMPLETED | OUTPATIENT
Start: 2025-02-13 | End: 2025-02-13

## 2025-02-13 RX ORDER — ENOXAPARIN SODIUM 100 MG/ML
60 INJECTION SUBCUTANEOUS ONCE
Refills: 0 | Status: COMPLETED | OUTPATIENT
Start: 2025-02-13 | End: 2025-02-13

## 2025-02-13 RX ADMIN — CEFTRIAXONE 100 MILLIGRAM(S): 500 INJECTION, POWDER, FOR SOLUTION INTRAMUSCULAR; INTRAVENOUS at 20:52

## 2025-02-13 RX ADMIN — Medication 1000 MILLILITER(S): at 18:06

## 2025-02-13 RX ADMIN — ENOXAPARIN SODIUM 60 MILLIGRAM(S): 100 INJECTION SUBCUTANEOUS at 21:48

## 2025-02-13 NOTE — ED PROVIDER NOTE - STUDIES
EKG - see results section for interpretation EKG - see results section for interpretation/CT Scan images

## 2025-02-13 NOTE — ED PROVIDER NOTE - OBJECTIVE STATEMENT
75 y/o female PMHx Parkinson's, DM, HTN, HLD presents for evaluation of Altered Mental Status. Per family at bedside, the pt had an incident where she was visibly blue and unresponsive that lasted a few seconds x 4 days ago. This resolved spontaneously. However, patient also started complaining of dysuria, increased pressure in the lower abdomen, dark urine and some right flank pain. She has also been noted to be acting more confused than baseline and not making sense when she speaks. She endorses chills but no fevers at home. No chest pain or shortness of breath. No recent illnesses or sick contacts. 75 y/o female PMHx Parkinson's, DM, HTN, HLD presents for evaluation of Altered Mental Status. Per family at bedside, the pt had an incident where she was visibly blue and unresponsive that lasted a few seconds x 4 days ago. This resolved spontaneously. However, patient also started complaining of dysuria, increased pressure in the lower abdomen, dark urine and some right flank pain on that day. She has also been noted to be acting more confused than baseline and not making sense when she speaks. She endorses chills but no fevers at home. She denies chest pain or any shortness of breath. No recent illnesses or sick contacts.

## 2025-02-13 NOTE — ED PROVIDER NOTE - CARE PLAN
1 Principal Discharge DX:	Pulmonary embolism  Secondary Diagnosis:	Acute UTI  Secondary Diagnosis:	Weakness

## 2025-02-13 NOTE — ED PROVIDER NOTE - ATTENDING CONTRIBUTION TO CARE
74-year-old female with past medical history of Parkinson's, diabetes, hypertension, hyperlipidemia, brought in by EMS with family for altered mental status. Per family patient had an incident where she was appear blue 4 days ago, no CPR was initiated, family states she had a pulse, resolved spontaneously, since then has been progressively generally weak, complaining of dysuria with increased pressure in the lower abdomen associated with right flank pain and dark urine.  Family reports has been more confused than baseline.  No fevers but patient reports chills.  No fall or trauma.  denies fever, n/v, cp, sob, pleuritic cp, palpitations, diaphoresis, cough, tinnitus, ear pain, hearing loss, neck pain/stiffness, back pain, photophobia/phonophobia, blurry vision/visual changes, abd pain, diarrhea, constipation, melena/brbpr,rinary symptoms, weakness, numbness/tingling, HA, syncope, sick contacts, recent travel or rash.     on exam:   Constitutional: non toxic appearing pt sitting on stretcher in nad.  Skin: no rash, no signs of trauma:  HEENT: EOM intact, no nystagmusn, mmm.  NECK and BACK: neck supple, no spinous ttp to neck or back, FROM, no palpable shelves or step offs, no meningeal signs.  CARDIO: regular rate, radial pulses 2/4 b/l, dp and pt pulses 2/4 b/l.  Lungs: Ctabl w/ breath sounds present b/l, no wheezing or crackles. no accessory muscle use, no tachypnea, no stridor  ABD: BS present throughout all 4 quadrants, abd soft, nd, lower mid abdominal pain to palpation, no rebound tenderness or guarding,  (+) R cvat,;  EXT: FROM of upper and lower ext, no drift, no calf pain/swelling/erythema.  NEURO: Awake and alert, following all commans. Motor 5/5 and sensation intact throughout upper and lower ext. CN II-XII intact. No facial droop or slurring of speech. (-) Pronator, NIHSS O.    Plan: EKG, chest x-ray, labs, IV fluids, urine, CT head, CT abdomen pelvis with IV contrast.  Will reassess.  Patient and family refused rectal temp.

## 2025-02-13 NOTE — ED PROVIDER NOTE - DIFFERENTIAL DIAGNOSIS
Differential Diagnosis The differential diagnosis for patients clinical presentation includes but is not limited to:  uti  pyelo  kidney stone  ich  metabolic vs infectious etiology  sepsis  arrhythmia

## 2025-02-13 NOTE — ED PROVIDER NOTE - CLINICAL SUMMARY MEDICAL DECISION MAKING FREE TEXT BOX
74-year-old female with past medical history of Parkinson's, diabetes, hypertension, hyperlipidemia, brought in by EMS with family for altered mental status. Per family patient had an incident where she was appear blue 4 days ago, no CPR was initiated, family states she had a pulse, resolved spontaneously, since then has been progressively generally weak, complaining of dysuria with increased pressure in the lower abdomen associated with right flank pain and dark urine.  Family reports has been more confused than baseline.  No fevers but patient reports chills.  No fall or trauma.  denies fever, n/v, cp, sob, pleuritic cp, palpitations, diaphoresis, cough, tinnitus, ear pain, hearing loss, neck pain/stiffness, back pain, photophobia/phonophobia, blurry vision/visual changes, abd pain, diarrhea, constipation, melena/brbpr,rinary symptoms, weakness, numbness/tingling, HA, syncope, sick contacts, recent travel or rash.     on exam:   Constitutional: non toxic appearing pt sitting on stretcher in nad.  Skin: no rash, no signs of trauma:  HEENT: EOM intact, no nystagmusn, mmm.  NECK and BACK: neck supple, no spinous ttp to neck or back, FROM, no palpable shelves or step offs, no meningeal signs.  CARDIO: regular rate, radial pulses 2/4 b/l, dp and pt pulses 2/4 b/l.  Lungs: Ctabl w/ breath sounds present b/l, no wheezing or crackles. no accessory muscle use, no tachypnea, no stridor  ABD: BS present throughout all 4 quadrants, abd soft, nd, lower mid abdominal pain to palpation, no rebound tenderness or guarding,  (+) R cvat,;  EXT: FROM of upper and lower ext, no drift, no calf pain/swelling/erythema.  NEURO: Awake and alert, following all commans. Motor 5/5 and sensation intact throughout upper and lower ext. CN II-XII intact. No facial droop or slurring of speech. (-) Pronator, NIHSS O.    Plan: EKG, chest x-ray, labs, IV fluids, urine, CT head, CT abdomen pelvis with IV contrast.  Will reassess.  Patient and family refused rectal temp.    Labs and EKG were ordered and reviewed.  Imaging was ordered and reviewed by me.  Appropriate medications for patient's presenting complaints were ordered and effects were reassessed.  Patient's records (prior hospital, ED visit) were reviewed.  Additional history was obtained from EMS, family. 74-year-old female with past medical history of Parkinson's, diabetes, hypertension, hyperlipidemia, brought in by EMS with family for altered mental status. Per family patient had an incident where she was appear blue 4 days ago, no CPR was initiated, family states she had a pulse, resolved spontaneously, since then has been progressively generally weak, complaining of dysuria with increased pressure in the lower abdomen associated with right flank pain and dark urine.  Family reports has been more confused than baseline.  No fevers but patient reports chills.  No fall or trauma.  denies fever, n/v, cp, sob, pleuritic cp, palpitations, diaphoresis, cough, tinnitus, ear pain, hearing loss, neck pain/stiffness, back pain, photophobia/phonophobia, blurry vision/visual changes, abd pain, diarrhea, constipation, melena/brbpr,rinary symptoms, weakness, numbness/tingling, HA, syncope, sick contacts, recent travel or rash.     on exam:   Constitutional: non toxic appearing pt sitting on stretcher in nad.  Skin: no rash, no signs of trauma:  HEENT: EOM intact, no nystagmusn, mmm.  NECK and BACK: neck supple, no spinous ttp to neck or back, FROM, no palpable shelves or step offs, no meningeal signs.  CARDIO: regular rate, radial pulses 2/4 b/l, dp and pt pulses 2/4 b/l.  Lungs: Ctabl w/ breath sounds present b/l, no wheezing or crackles. no accessory muscle use, no tachypnea, no stridor  ABD: BS present throughout all 4 quadrants, abd soft, nd, lower mid abdominal pain to palpation, no rebound tenderness or guarding,  (+) R cvat,;  EXT: FROM of upper and lower ext, no drift, no calf pain/swelling/erythema.  NEURO: Awake and alert, following all commans. Motor 5/5 and sensation intact throughout upper and lower ext. CN II-XII intact. No facial droop or slurring of speech. (-) Pronator, NIHSS O.    Plan: EKG, chest x-ray, labs, IV fluids, urine, CT head, CT abdomen pelvis with IV contrast.  Will reassess.  Patient and family refused rectal temp.    Labs and EKG were ordered and reviewed.  Imaging was ordered and reviewed by me.  Appropriate medications for patient's presenting complaints were ordered and effects were reassessed.  Patient's records (prior hospital, ED visit) were reviewed.  Additional history was obtained from EMS, family. Escalation to admission/observation was considered. Patient requires inpatient hospitalization - monitored setting.  I have fully discussed the medical management and delivery of care with the patient and Family. I have discussed any available labs, imaging and treatment options.  Pt admitted for further care & management.  ICU team reports admission to telemetry, patient and family aware, patient in no respiratory distress, medical admitting team aware of patient and admission.

## 2025-02-13 NOTE — ED ADULT TRIAGE NOTE - NSWEIGHTCALCTOOLDRUG_GEN_A_CORE
used
Quality 226: Preventive Care And Screening: Tobacco Use: Screening And Cessation Intervention: Tobacco Screening not Performed for Medical Reasons
Quality 130: Documentation Of Current Medications In The Medical Record: Current Medications Documented
Detail Level: Detailed
Quality 431: Preventive Care And Screening: Unhealthy Alcohol Use - Screening: Unhealthy alcohol use screening not performed, reason not otherwise specified
Quality 110: Preventive Care And Screening: Influenza Immunization: Influenza Immunization previously received during influenza season

## 2025-02-13 NOTE — ED PROVIDER NOTE - PROGRESS NOTE DETAILS
ED Attending FERNANDO Orozco  Spoke to radiologist Dr. Ray, incidentally right lower right lower and middle lobe pulmonary emboli with right heart strain, questionable left lower lobe emboli, troponin N/V Dionna P added, family made aware, PERT team activated, pulmonary fellow aware of results.  BUNs/creatinine 33/0.8, 13 patient saturating 95 on room air, denies any shortness of breath, presented for urinary symptoms, abdominal pain and right flank pain, due to incidental findings rest of labs and imaging ordered, patient on monitor.  Will reassess. ED Attending FERNANDO Orozco  Bedside ultrasound with D-sign, pulmonary follow-up bedside, Lovenox ordered, creatinine normal. GT:  Discussed with ICU Fellow, updated regarding results of bedside echo. Pending labs and CT PE Protocol. ED Attending FERNANDO Orozco report intervention at this time. GT:  Discussed with ICU fellow regarding updated labs. Recommends admission to Med Telemetry, will follow CT Angio Chest. ED Attending FERNANDO Orozco  ICU team reports admission to telemetry, patient and family aware, patient in no respiratory distress, medical admitting team aware of patient and admission.

## 2025-02-13 NOTE — ED PROVIDER NOTE - EKG/XRAY ADDITIONAL INFORMATION
Attempt to get ekg multiple times. Pt difficult to get ekg on.    Normal sinus rhythm 85 bpm, MO interval 144, QRS 88, QTc 492, no ST elevations or depressions.  [id number].

## 2025-02-13 NOTE — ED PROVIDER NOTE - PHYSICAL EXAMINATION
CONSTITUTIONAL: well developed, nontoxic appearing, in no acute distress, speaking in full sentences  SKIN: warm, dry, no rash  HEENT: normocephalic, no conjunctival erythema, dry mucous membranes, patent airway  NECK: supple  CV:  regular rate  RESP: normal work of breathing  ABD: nondistended, soft, mild suprapubic ttp, right cvat   MSK: moves all extremities, no cyanosis, no edema  NEURO: A&Ox3 (baseline), CN 2-12 grossly intact, no pronator drift, Negative Romberg   PSYCH: cooperative, appropriate

## 2025-02-14 DIAGNOSIS — R09.89 OTHER SPECIFIED SYMPTOMS AND SIGNS INVOLVING THE CIRCULATORY AND RESPIRATORY SYSTEMS: ICD-10-CM

## 2025-02-14 DIAGNOSIS — I26.99 OTHER PULMONARY EMBOLISM WITHOUT ACUTE COR PULMONALE: ICD-10-CM

## 2025-02-14 LAB
A1C WITH ESTIMATED AVERAGE GLUCOSE RESULT: 6.2 % — HIGH (ref 4–5.6)
ALBUMIN SERPL ELPH-MCNC: 3.6 G/DL — SIGNIFICANT CHANGE UP (ref 3.5–5.2)
ALP SERPL-CCNC: 61 U/L — SIGNIFICANT CHANGE UP (ref 30–115)
ALT FLD-CCNC: <5 U/L — SIGNIFICANT CHANGE UP (ref 0–41)
ANION GAP SERPL CALC-SCNC: 18 MMOL/L — HIGH (ref 7–14)
APPEARANCE UR: CLEAR — SIGNIFICANT CHANGE UP
AST SERPL-CCNC: 9 U/L — SIGNIFICANT CHANGE UP (ref 0–41)
BASOPHILS # BLD AUTO: 0.02 K/UL — SIGNIFICANT CHANGE UP (ref 0–0.2)
BASOPHILS NFR BLD AUTO: 0.3 % — SIGNIFICANT CHANGE UP (ref 0–1)
BILIRUB SERPL-MCNC: 0.8 MG/DL — SIGNIFICANT CHANGE UP (ref 0.2–1.2)
BILIRUB UR-MCNC: NEGATIVE — SIGNIFICANT CHANGE UP
BUN SERPL-MCNC: 22 MG/DL — HIGH (ref 10–20)
CALCIUM SERPL-MCNC: 8.2 MG/DL — LOW (ref 8.4–10.5)
CHLORIDE SERPL-SCNC: 97 MMOL/L — LOW (ref 98–110)
CO2 SERPL-SCNC: 21 MMOL/L — SIGNIFICANT CHANGE UP (ref 17–32)
COLOR SPEC: YELLOW — SIGNIFICANT CHANGE UP
CREAT SERPL-MCNC: 0.7 MG/DL — SIGNIFICANT CHANGE UP (ref 0.7–1.5)
DIFF PNL FLD: NEGATIVE — SIGNIFICANT CHANGE UP
EGFR: 91 ML/MIN/1.73M2 — SIGNIFICANT CHANGE UP
EOSINOPHIL # BLD AUTO: 0.01 K/UL — SIGNIFICANT CHANGE UP (ref 0–0.7)
EOSINOPHIL NFR BLD AUTO: 0.2 % — SIGNIFICANT CHANGE UP (ref 0–8)
ESTIMATED AVERAGE GLUCOSE: 131 MG/DL — HIGH (ref 68–114)
GLUCOSE BLDC GLUCOMTR-MCNC: 107 MG/DL — HIGH (ref 70–99)
GLUCOSE BLDC GLUCOMTR-MCNC: 110 MG/DL — HIGH (ref 70–99)
GLUCOSE BLDC GLUCOMTR-MCNC: 129 MG/DL — HIGH (ref 70–99)
GLUCOSE BLDC GLUCOMTR-MCNC: 91 MG/DL — SIGNIFICANT CHANGE UP (ref 70–99)
GLUCOSE BLDC GLUCOMTR-MCNC: 93 MG/DL — SIGNIFICANT CHANGE UP (ref 70–99)
GLUCOSE SERPL-MCNC: 77 MG/DL — SIGNIFICANT CHANGE UP (ref 70–99)
GLUCOSE UR QL: NEGATIVE MG/DL — SIGNIFICANT CHANGE UP
HCT VFR BLD CALC: 36.2 % — LOW (ref 37–47)
HGB BLD-MCNC: 12.1 G/DL — SIGNIFICANT CHANGE UP (ref 12–16)
IMM GRANULOCYTES NFR BLD AUTO: 1 % — HIGH (ref 0.1–0.3)
KETONES UR-MCNC: 15 MG/DL
LEUKOCYTE ESTERASE UR-ACNC: NEGATIVE — SIGNIFICANT CHANGE UP
LYMPHOCYTES # BLD AUTO: 1.09 K/UL — LOW (ref 1.2–3.4)
LYMPHOCYTES # BLD AUTO: 18.6 % — LOW (ref 20.5–51.1)
MAGNESIUM SERPL-MCNC: 1.5 MG/DL — LOW (ref 1.8–2.4)
MCHC RBC-ENTMCNC: 30.1 PG — SIGNIFICANT CHANGE UP (ref 27–31)
MCHC RBC-ENTMCNC: 33.4 G/DL — SIGNIFICANT CHANGE UP (ref 32–37)
MCV RBC AUTO: 90 FL — SIGNIFICANT CHANGE UP (ref 81–99)
MONOCYTES # BLD AUTO: 0.41 K/UL — SIGNIFICANT CHANGE UP (ref 0.1–0.6)
MONOCYTES NFR BLD AUTO: 7 % — SIGNIFICANT CHANGE UP (ref 1.7–9.3)
NEUTROPHILS # BLD AUTO: 4.26 K/UL — SIGNIFICANT CHANGE UP (ref 1.4–6.5)
NEUTROPHILS NFR BLD AUTO: 72.9 % — SIGNIFICANT CHANGE UP (ref 42.2–75.2)
NITRITE UR-MCNC: NEGATIVE — SIGNIFICANT CHANGE UP
NRBC BLD AUTO-RTO: 0 /100 WBCS — SIGNIFICANT CHANGE UP (ref 0–0)
PH UR: 6 — SIGNIFICANT CHANGE UP (ref 5–8)
PLATELET # BLD AUTO: 229 K/UL — SIGNIFICANT CHANGE UP (ref 130–400)
PMV BLD: 11.7 FL — HIGH (ref 7.4–10.4)
POTASSIUM SERPL-MCNC: 3.7 MMOL/L — SIGNIFICANT CHANGE UP (ref 3.5–5)
POTASSIUM SERPL-SCNC: 3.7 MMOL/L — SIGNIFICANT CHANGE UP (ref 3.5–5)
PROT SERPL-MCNC: 5.4 G/DL — LOW (ref 6–8)
PROT UR-MCNC: SIGNIFICANT CHANGE UP MG/DL
RBC # BLD: 4.02 M/UL — LOW (ref 4.2–5.4)
RBC # FLD: 13.1 % — SIGNIFICANT CHANGE UP (ref 11.5–14.5)
SODIUM SERPL-SCNC: 135 MMOL/L — SIGNIFICANT CHANGE UP (ref 135–146)
SP GR SPEC: >1.03 — HIGH (ref 1–1.03)
TROPONIN T, HIGH SENSITIVITY RESULT: 22 NG/L — HIGH (ref 6–13)
TROPONIN T, HIGH SENSITIVITY RESULT: 24 NG/L — HIGH (ref 6–13)
UROBILINOGEN FLD QL: 1 MG/DL — SIGNIFICANT CHANGE UP (ref 0.2–1)
WBC # BLD: 5.85 K/UL — SIGNIFICANT CHANGE UP (ref 4.8–10.8)
WBC # FLD AUTO: 5.85 K/UL — SIGNIFICANT CHANGE UP (ref 4.8–10.8)

## 2025-02-14 PROCEDURE — 93306 TTE W/DOPPLER COMPLETE: CPT | Mod: 26

## 2025-02-14 PROCEDURE — 93970 EXTREMITY STUDY: CPT | Mod: 26

## 2025-02-14 PROCEDURE — 95816 EEG AWAKE AND DROWSY: CPT | Mod: 26

## 2025-02-14 PROCEDURE — 93010 ELECTROCARDIOGRAM REPORT: CPT

## 2025-02-14 PROCEDURE — 99222 1ST HOSP IP/OBS MODERATE 55: CPT

## 2025-02-14 PROCEDURE — 99223 1ST HOSP IP/OBS HIGH 75: CPT | Mod: GC

## 2025-02-14 RX ORDER — DEXTROSE 50 % IN WATER 50 %
25 SYRINGE (ML) INTRAVENOUS ONCE
Refills: 0 | Status: DISCONTINUED | OUTPATIENT
Start: 2025-02-14 | End: 2025-02-20

## 2025-02-14 RX ORDER — SODIUM CHLORIDE 9 G/1000ML
1000 INJECTION, SOLUTION INTRAVENOUS
Refills: 0 | Status: DISCONTINUED | OUTPATIENT
Start: 2025-02-14 | End: 2025-02-20

## 2025-02-14 RX ORDER — DEXTROSE 50 % IN WATER 50 %
12.5 SYRINGE (ML) INTRAVENOUS ONCE
Refills: 0 | Status: DISCONTINUED | OUTPATIENT
Start: 2025-02-14 | End: 2025-02-20

## 2025-02-14 RX ORDER — INSULIN LISPRO 100 U/ML
INJECTION, SOLUTION INTRAVENOUS; SUBCUTANEOUS
Refills: 0 | Status: DISCONTINUED | OUTPATIENT
Start: 2025-02-14 | End: 2025-02-20

## 2025-02-14 RX ORDER — ACETAMINOPHEN 500 MG/5ML
650 LIQUID (ML) ORAL EVERY 6 HOURS
Refills: 0 | Status: DISCONTINUED | OUTPATIENT
Start: 2025-02-14 | End: 2025-02-20

## 2025-02-14 RX ORDER — CLONAZEPAM 0.5 MG/1
0.25 TABLET ORAL ONCE
Refills: 0 | Status: DISCONTINUED | OUTPATIENT
Start: 2025-02-14 | End: 2025-02-14

## 2025-02-14 RX ORDER — CLONAZEPAM 0.5 MG/1
0.25 TABLET ORAL
Refills: 0 | Status: DISCONTINUED | OUTPATIENT
Start: 2025-02-14 | End: 2025-02-20

## 2025-02-14 RX ORDER — CEFTRIAXONE 500 MG/1
1000 INJECTION, POWDER, FOR SOLUTION INTRAMUSCULAR; INTRAVENOUS
Refills: 0 | Status: DISCONTINUED | OUTPATIENT
Start: 2025-02-14 | End: 2025-02-16

## 2025-02-14 RX ORDER — MAGNESIUM SULFATE 500 MG/ML
2 SYRINGE (ML) INJECTION ONCE
Refills: 0 | Status: COMPLETED | OUTPATIENT
Start: 2025-02-14 | End: 2025-02-14

## 2025-02-14 RX ORDER — GLUCAGON 3 MG/1
1 POWDER NASAL ONCE
Refills: 0 | Status: DISCONTINUED | OUTPATIENT
Start: 2025-02-14 | End: 2025-02-20

## 2025-02-14 RX ORDER — MAGNESIUM, ALUMINUM HYDROXIDE 200-200 MG
30 TABLET,CHEWABLE ORAL EVERY 4 HOURS
Refills: 0 | Status: DISCONTINUED | OUTPATIENT
Start: 2025-02-14 | End: 2025-02-20

## 2025-02-14 RX ORDER — ENOXAPARIN SODIUM 100 MG/ML
70 INJECTION SUBCUTANEOUS EVERY 12 HOURS
Refills: 0 | Status: DISCONTINUED | OUTPATIENT
Start: 2025-02-14 | End: 2025-02-17

## 2025-02-14 RX ORDER — ESZOPICLONE 2 MG/1
1 TABLET, FILM COATED ORAL
Refills: 0 | DISCHARGE

## 2025-02-14 RX ORDER — METOPROLOL SUCCINATE 50 MG/1
50 TABLET, EXTENDED RELEASE ORAL ONCE
Refills: 0 | Status: COMPLETED | OUTPATIENT
Start: 2025-02-14 | End: 2025-02-14

## 2025-02-14 RX ORDER — CLONAZEPAM 0.5 MG/1
1 TABLET ORAL
Refills: 0 | DISCHARGE

## 2025-02-14 RX ORDER — CARBIDOPA/LEVODOPA 25MG-100MG
2 TABLET ORAL
Refills: 0 | DISCHARGE

## 2025-02-14 RX ORDER — MELATONIN 5 MG
3 TABLET ORAL AT BEDTIME
Refills: 0 | Status: DISCONTINUED | OUTPATIENT
Start: 2025-02-14 | End: 2025-02-20

## 2025-02-14 RX ORDER — METOPROLOL SUCCINATE 50 MG/1
50 TABLET, EXTENDED RELEASE ORAL
Refills: 0 | Status: DISCONTINUED | OUTPATIENT
Start: 2025-02-14 | End: 2025-02-14

## 2025-02-14 RX ORDER — DEXTROSE 50 % IN WATER 50 %
15 SYRINGE (ML) INTRAVENOUS ONCE
Refills: 0 | Status: DISCONTINUED | OUTPATIENT
Start: 2025-02-14 | End: 2025-02-20

## 2025-02-14 RX ORDER — METOPROLOL SUCCINATE 50 MG/1
50 TABLET, EXTENDED RELEASE ORAL
Refills: 0 | Status: DISCONTINUED | OUTPATIENT
Start: 2025-02-14 | End: 2025-02-19

## 2025-02-14 RX ORDER — INSULIN LISPRO 100 U/ML
INJECTION, SOLUTION INTRAVENOUS; SUBCUTANEOUS AT BEDTIME
Refills: 0 | Status: DISCONTINUED | OUTPATIENT
Start: 2025-02-14 | End: 2025-02-20

## 2025-02-14 RX ORDER — ONDANSETRON HCL/PF 4 MG/2 ML
4 VIAL (ML) INJECTION EVERY 8 HOURS
Refills: 0 | Status: DISCONTINUED | OUTPATIENT
Start: 2025-02-14 | End: 2025-02-20

## 2025-02-14 RX ADMIN — CLONAZEPAM 0.25 MILLIGRAM(S): 0.5 TABLET ORAL at 18:45

## 2025-02-14 RX ADMIN — Medication 650 MILLIGRAM(S): at 19:05

## 2025-02-14 RX ADMIN — METOPROLOL SUCCINATE 50 MILLIGRAM(S): 50 TABLET, EXTENDED RELEASE ORAL at 03:40

## 2025-02-14 RX ADMIN — Medication 3 MILLIGRAM(S): at 18:35

## 2025-02-14 RX ADMIN — CEFTRIAXONE 100 MILLIGRAM(S): 500 INJECTION, POWDER, FOR SOLUTION INTRAMUSCULAR; INTRAVENOUS at 20:41

## 2025-02-14 RX ADMIN — Medication 650 MILLIGRAM(S): at 18:35

## 2025-02-14 RX ADMIN — Medication 650 MILLIGRAM(S): at 00:24

## 2025-02-14 RX ADMIN — METOPROLOL SUCCINATE 50 MILLIGRAM(S): 50 TABLET, EXTENDED RELEASE ORAL at 18:35

## 2025-02-14 RX ADMIN — CLONAZEPAM 0.25 MILLIGRAM(S): 0.5 TABLET ORAL at 03:40

## 2025-02-14 RX ADMIN — Medication 650 MILLIGRAM(S): at 17:16

## 2025-02-14 RX ADMIN — Medication 650 MILLIGRAM(S): at 06:27

## 2025-02-14 RX ADMIN — ENOXAPARIN SODIUM 70 MILLIGRAM(S): 100 INJECTION SUBCUTANEOUS at 05:25

## 2025-02-14 RX ADMIN — Medication 5 MILLIGRAM(S): at 00:24

## 2025-02-14 RX ADMIN — Medication 25 GRAM(S): at 13:57

## 2025-02-14 RX ADMIN — ENOXAPARIN SODIUM 70 MILLIGRAM(S): 100 INJECTION SUBCUTANEOUS at 18:44

## 2025-02-14 NOTE — H&P ADULT - ATTENDING COMMENTS
75 y/o female PMH Parkinson's, DM, HTN, HLD presents for evaluation of Altered Mental Status. Found to have acute pulmonary embolism.    #Bilateral PEs with RHS  CT Chest PE protocol showing Acute PE with evidence of right heart strain  dw IR  -fu pulm  -Lovenox therapeutic  -LE Duplex  -Obtain TTE  - Trend troponins    #Suspected cystitis  - fu UCx  - Cont IV rocephin    #Parkinson  -C/W crexont, clonazepam, ambien for eszipiclone    #DM  #HTN  #HLD  -ISS for metformin, A1C  -Lopressor 50 BID for nebivolol 5 BID    #DVT PPX- Lovenox    #Progress Note Handoff  Pending (specify): Cont therapeutic lovenox, fu Echo, fu UCx  Family discussion: Team taco family regarding plan of care  Disposition: Tele 73 y/o female PMH Parkinson's, DM, HTN, HLD presents for evaluation of Altered Mental Status. Found to have acute pulmonary embolism.    #Bilateral PEs with RHS  CT Chest PE protocol showing Acute PE with evidence of right heart strain  dw IR  -fu pulm  -Lovenox therapeutic  -LE Duplex  -Obtain TTE  - Trend troponins    #Metabolic Encephalopathic  #Suspected cystitis  #Delirium  Family states pt has been confused since unresponsive episode 4 days ago  CTH unremarkable  - fu UCx  - Cont IV rocephin  - rEEG  - If still confused tomorrow --> MRI Head NC    #Parkinson  -C/W crexont, clonazepam, ambien for eszipiclone    #DM  #HTN  #HLD  -ISS for metformin, A1C  -Lopressor 50 BID for nebivolol 5 BID    #DVT PPX- Lovenox    #Progress Note Handoff  Pending (specify): Cont therapeutic lovenox, fu Echo, fu UCx  Family discussion: Team dw family regarding plan of care  Disposition: Tele

## 2025-02-14 NOTE — CONSULT NOTE ADULT - ASSESSMENT
Assessment:  Patient is a 74y old  Female who presents to the ED with a chief complaint of PE w RHS.    Parkinsons  Hypertension  Hyperlipidemia  Diabetes  Acute Segmental PE       Plan:  - DVT work-up  - C/w with IV heparin  - Obtain an Echo and repeat Trop  - Will follow-up as needed        Assessment:  Patient is a 74y old  Female who presents to the ED with a chief complaint of PE w RHS.    Parkinsons  Hypertension  Hyperlipidemia  Diabetes  Acute Segmental PE       Plan:  - DVT work-up  - C/w with Lovenox  - Obtain an Echo  - Will follow-up as needed        Assessment:  Patient is a 74y old  Female who presents to the ED with a chief complaint of PE w RHS.      Acute bilateral, distal main PA emboli with acute cor pulmonale  Parkinsons  Hypertension  Hyperlipidemia  Diabetes         Plan:  - C/w with therapeutic Lovenox  - Transition to DOAC prior to d/c  - Obtain an Echo, bilateral LEVDUS  - Likely no role for mechanical thrombectomy  - After d/c, will need age-appropriate cancer screening and follow-up with hematology for hypercoagulable workup  - Outpatient pulm follow-up in 6-12 weeks to assess for improvement  - Will follow-up as needed

## 2025-02-14 NOTE — H&P ADULT - HISTORY OF PRESENT ILLNESS
75 y/o female PMH Parkinson's, DM, HTN, HLD presents for evaluation of Altered Mental Status. Per family at bedside, the pt had an incident a few days ago where she was visibly blue and unresponsive that lasted a few minutes 4 days ago. This resolved spontaneously. However, patient also started complaining of dysuria, increased pressure in the lower abdomen, dark urine and some right flank pain on that day. She has also been noted to be acting more confused than baseline and not making sense when she speaks. She endorses chills but no fevers at home. She denies chest pain or any shortness of breath. No recent illnesses or sick contacts. Upon my assessment, patient is pleasant but confused, says she's feeling well, but not giving meaningful responses to other questions.  In the ED, /72, HR 94, RR 18, satting 95% on RA, afebrile    CXR wnl. CT head- No evidence of acute transcortical infarct, acute intracranial hemorrhage, or mass effect.  CTAP- 2.  No CT evidence of an acute abdominopelvic pathology.  CTPE- Acute pulmonary emboli with evidence of right heart strain, correlating with 2/13/2025 CT: Distal left main pulmonary artery extending into the anterior segmental branch. Left lower lobe segmental branch. Right middle lobe and right lower lobe segmental branches.  Given lovenox, rocephin, IVF and admitted to medicine for acute PEs with RHS    ALLERGIES:  No Known Allergies    MEDICATIONS  (STANDING):  clonazePAM Oral Disintegrating Tablet 0.25 milliGRAM(s) Oral two times a day  CREXONT 70mg/280 mg Extended release cap 2 Capsule(s)   Oral four times a day  dextrose 5%. 1000 milliLiter(s) (50 mL/Hr) IV Continuous <Continuous>  dextrose 5%. 1000 milliLiter(s) (100 mL/Hr) IV Continuous <Continuous>  dextrose 50% Injectable 25 Gram(s) IV Push once  dextrose 50% Injectable 12.5 Gram(s) IV Push once  dextrose 50% Injectable 25 Gram(s) IV Push once  enoxaparin Injectable 70 milliGRAM(s) SubCutaneous every 12 hours  glucagon  Injectable 1 milliGRAM(s) IntraMuscular once  insulin lispro (ADMELOG) corrective regimen sliding scale   SubCutaneous three times a day before meals  insulin lispro (ADMELOG) corrective regimen sliding scale   SubCutaneous at bedtime  metoprolol tartrate 50 milliGRAM(s) Oral two times a day    MEDICATIONS  (PRN):  acetaminophen     Tablet .. 650 milliGRAM(s) Oral every 6 hours PRN Temp greater or equal to 38C (100.4F), Mild Pain (1 - 3)  aluminum hydroxide/magnesium hydroxide/simethicone Suspension 30 milliLiter(s) Oral every 4 hours PRN Dyspepsia  dextrose Oral Gel 15 Gram(s) Oral once PRN Blood Glucose LESS THAN 70 milliGRAM(s)/deciliter  melatonin 3 milliGRAM(s) Oral at bedtime PRN Insomnia  ondansetron Injectable 4 milliGRAM(s) IV Push every 8 hours PRN Nausea and/or Vomiting  zolpidem 5 milliGRAM(s) Oral at bedtime PRN Insomnia    Vital Signs Last 24 Hrs  T(F): 97.4 (14 Feb 2025 00:58), Max: 98 (13 Feb 2025 15:55)  HR: 82 (14 Feb 2025 00:58) (82 - 94)  BP: 114/65 (14 Feb 2025 00:58) (105/72 - 114/65)  RR: 22 (14 Feb 2025 00:58) (18 - 22)  SpO2: 96% (14 Feb 2025 00:58) (95% - 96%)  I&O's Summary    PHYSICAL EXAM:  General: NAD, A/O x 1  ENT: Moist mucous membranes, no thrush  Neck: Supple, No JVD  Lungs: Clear to auscultation bilaterally, good air entry, non-labored breathing  Cardio: RRR, S1/S2, No murmur  Abdomen: Soft, Nontender, Nondistended; Bowel sounds present  Extremities: No calf tenderness, No pitting edema    LABS:                        12.4   4.42  )-----------( 243      ( 13 Feb 2025 18:00 )             36.1     02-13    131  |  96  |  33  ----------------------------<  96  3.8   |  24  |  0.8    Ca    8.2      13 Feb 2025 18:00    TPro  5.4  /  Alb  3.6  /  TBili  0.9  /  DBili  x   /  AST  9   /  ALT  <5  /  AlkPhos  61  02-13      Lipase: 40 U/L (02-13-25 @ 18:00)      PT/INR - ( 13 Feb 2025 21:38 )   PT: 12.30 sec;   INR: 1.04 ratio         PTT - ( 13 Feb 2025 21:38 )  PTT:30.2 sec  Lactate, Blood: 1.5 mmol/L (02-13 @ 18:00)  POCT Blood Glucose.: 91 mg/dL (14 Feb 2025 00:56)      Urinalysis Basic - ( 13 Feb 2025 18:01 )    Color: Dark Yellow / Appearance: Clear / SG: >1.030 / pH: x  Gluc: x / Ketone: 15 mg/dL  / Bili: Moderate / Urobili: 1.0 mg/dL   Blood: x / Protein: 30 mg/dL / Nitrite: Negative   Leuk Esterase: Trace / RBC: 2 /HPF / WBC 5 /HPF   Sq Epi: x / Non Sq Epi: >36 /HPF / Bacteria: Many /HPF   73 y/o female PMH Parkinson's, DM, HTN, HLD presents for evaluation of Altered Mental Status. Per family at bedside, the pt had an incident a few days ago where she was visibly blue and unresponsive that lasted a few minutes 4 days ago. This resolved spontaneously. However, patient also started complaining of dysuria, increased pressure in the lower abdomen, dark urine and some right flank pain on that day. She has also been noted to be acting more confused than baseline and not making sense when she speaks. She endorses chills but no fevers at home. She denies chest pain or any shortness of breath. No recent illnesses or sick contacts. Upon my assessment, patient is pleasant but confused, says she's feeling well, but not giving meaningful responses to other questions.  In the ED, /72, HR 94, RR 18, satting 95% on RA, afebrile  Coags wnl, Trop 14, , UA pos but contaminated, lactate 1.5, lipase 40, Na 131, K 3.8, Cr 0.8, LFTs wnl, WBC 4.4, Hgb 12.4,   CXR wnl. CT head- No evidence of acute transcortical infarct, acute intracranial hemorrhage, or mass effect.  CTAP- 2.  No CT evidence of an acute abdominopelvic pathology.  CTPE- Acute pulmonary emboli with evidence of right heart strain, correlating with 2/13/2025 CT: Distal left main pulmonary artery extending into the anterior segmental branch. Left lower lobe segmental branch. Right middle lobe and right lower lobe segmental branches.  Given lovenox, rocephin, IVF and admitted to medicine for acute PEs with RHS    ALLERGIES:  No Known Allergies    MEDICATIONS  (STANDING):  clonazePAM Oral Disintegrating Tablet 0.25 milliGRAM(s) Oral two times a day  CREXONT 70mg/280 mg Extended release cap 2 Capsule(s)   Oral four times a day  dextrose 5%. 1000 milliLiter(s) (50 mL/Hr) IV Continuous <Continuous>  dextrose 5%. 1000 milliLiter(s) (100 mL/Hr) IV Continuous <Continuous>  dextrose 50% Injectable 25 Gram(s) IV Push once  dextrose 50% Injectable 12.5 Gram(s) IV Push once  dextrose 50% Injectable 25 Gram(s) IV Push once  enoxaparin Injectable 70 milliGRAM(s) SubCutaneous every 12 hours  glucagon  Injectable 1 milliGRAM(s) IntraMuscular once  insulin lispro (ADMELOG) corrective regimen sliding scale   SubCutaneous three times a day before meals  insulin lispro (ADMELOG) corrective regimen sliding scale   SubCutaneous at bedtime  metoprolol tartrate 50 milliGRAM(s) Oral two times a day    MEDICATIONS  (PRN):  acetaminophen     Tablet .. 650 milliGRAM(s) Oral every 6 hours PRN Temp greater or equal to 38C (100.4F), Mild Pain (1 - 3)  aluminum hydroxide/magnesium hydroxide/simethicone Suspension 30 milliLiter(s) Oral every 4 hours PRN Dyspepsia  dextrose Oral Gel 15 Gram(s) Oral once PRN Blood Glucose LESS THAN 70 milliGRAM(s)/deciliter  melatonin 3 milliGRAM(s) Oral at bedtime PRN Insomnia  ondansetron Injectable 4 milliGRAM(s) IV Push every 8 hours PRN Nausea and/or Vomiting  zolpidem 5 milliGRAM(s) Oral at bedtime PRN Insomnia    Vital Signs Last 24 Hrs  T(F): 97.4 (14 Feb 2025 00:58), Max: 98 (13 Feb 2025 15:55)  HR: 82 (14 Feb 2025 00:58) (82 - 94)  BP: 114/65 (14 Feb 2025 00:58) (105/72 - 114/65)  RR: 22 (14 Feb 2025 00:58) (18 - 22)  SpO2: 96% (14 Feb 2025 00:58) (95% - 96%)  I&O's Summary    PHYSICAL EXAM:  General: NAD, A/O x 1  ENT: Moist mucous membranes, no thrush  Neck: Supple, No JVD  Lungs: Clear to auscultation bilaterally, good air entry, non-labored breathing  Cardio: RRR, S1/S2, No murmur  Abdomen: Soft, Nontender, Nondistended; Bowel sounds present  Extremities: No calf tenderness, No pitting edema    LABS:                        12.4   4.42  )-----------( 243      ( 13 Feb 2025 18:00 )             36.1     02-13    131  |  96  |  33  ----------------------------<  96  3.8   |  24  |  0.8    Ca    8.2      13 Feb 2025 18:00    TPro  5.4  /  Alb  3.6  /  TBili  0.9  /  DBili  x   /  AST  9   /  ALT  <5  /  AlkPhos  61  02-13      Lipase: 40 U/L (02-13-25 @ 18:00)      PT/INR - ( 13 Feb 2025 21:38 )   PT: 12.30 sec;   INR: 1.04 ratio         PTT - ( 13 Feb 2025 21:38 )  PTT:30.2 sec  Lactate, Blood: 1.5 mmol/L (02-13 @ 18:00)  POCT Blood Glucose.: 91 mg/dL (14 Feb 2025 00:56)      Urinalysis Basic - ( 13 Feb 2025 18:01 )    Color: Dark Yellow / Appearance: Clear / SG: >1.030 / pH: x  Gluc: x / Ketone: 15 mg/dL  / Bili: Moderate / Urobili: 1.0 mg/dL   Blood: x / Protein: 30 mg/dL / Nitrite: Negative   Leuk Esterase: Trace / RBC: 2 /HPF / WBC 5 /HPF   Sq Epi: x / Non Sq Epi: >36 /HPF / Bacteria: Many /HPF

## 2025-02-14 NOTE — H&P ADULT - ASSESSMENT
75 y/o female PMH Parkinson's, DM, HTN, HLD presents for evaluation of Altered Mental Status. Per family at bedside, the pt had an incident a few days ago where she was visibly blue and unresponsive that lasted a few minutes 4 days ago. This resolved spontaneously. However, patient also started complaining of dysuria, increased pressure in the lower abdomen, dark urine and some right flank pain on that day. She has also been noted to be acting more confused than baseline and not making sense when she speaks. She endorses chills but no fevers at home. She denies chest pain or any shortness of breath. No recent illnesses or sick contacts. Upon my assessment, patient is pleasant but confused, says she's feeling well, but not giving meaningful responses to other questions.  In the ED, /72, HR 94, RR 18, satting 95% on RA, afebrile  Coags wnl, Trop 14, , UA pos but contaminated, lactate 1.5, lipase 40, Na 131, K 3.8, Cr 0.8, LFTs wnl, WBC 4.4, Hgb 12.4,   CXR wnl. CT head- No evidence of acute transcortical infarct, acute intracranial hemorrhage, or mass effect.  CTAP- 2.  No CT evidence of an acute abdominopelvic pathology.  CTPE- Acute pulmonary emboli with evidence of right heart strain, correlating with 2/13/2025 CT: Distal left main pulmonary artery extending into the anterior segmental branch. Left lower lobe segmental branch. Right middle lobe and right lower lobe segmental branches.  Given lovenox, rocephin, IVF and admitted to medicine for acute PEs with RHS    #Bilateral PEs with RHS  -IR and pulm consults  -Lovenox therapeutic  -LE Duplex  -can repeat Trop  -Obtain TTE      #Cystitis?  -Repeat UA and urine culture  -Rocephin for now    #Parkinson  -C/W crexont, clonazepam, ambien for eszipiclone    #DM  #HTN  #HLD  -ISS for metformin, A1C  -Lopressor 50 BID for nebivolol 5 BID    #DVT PPX- Lovenox  #Diet- DASH, Diabetic  #Dispo- Tele

## 2025-02-14 NOTE — CONSULT NOTE ADULT - SUBJECTIVE AND OBJECTIVE BOX
Patient is a 74y old  Female who presents with a chief complaint of PE w RHS (14 Feb 2025 09:03)      HPI:  73 y/o female PMH Parkinson's, DM, HTN, HLD presents for evaluation of Altered Mental Status. Per family at bedside, the pt had an incident a few days ago where she was visibly blue and unresponsive that lasted a few minutes 4 days ago. This resolved spontaneously. However, patient also started complaining of dysuria, increased pressure in the lower abdomen, dark urine and some right flank pain on that day. She has also been noted to be acting more confused than baseline and not making sense when she speaks. She endorses chills but no fevers at home. She denies chest pain or any shortness of breath. No recent illnesses or sick contacts. Upon my assessment, patient is pleasant but confused, says she's feeling well, but not giving meaningful responses to other questions.  In the ED, /72, HR 94, RR 18, satting 95% on RA, afebrile  Coags wnl, Trop 14, , UA pos but contaminated, lactate 1.5, lipase 40, Na 131, K 3.8, Cr 0.8, LFTs wnl, WBC 4.4, Hgb 12.4,   CXR wnl. CT head- No evidence of acute transcortical infarct, acute intracranial hemorrhage, or mass effect.  CTAP- 2.  No CT evidence of an acute abdominopelvic pathology.  CTPE- Acute pulmonary emboli with evidence of right heart strain, correlating with 2/13/2025 CT: Distal left main pulmonary artery extending into the anterior segmental branch. Left lower lobe segmental branch. Right middle lobe and right lower lobe segmental branches.  Given lovenox, rocephin, IVF and admitted to medicine for acute PEs with RHS.    ALLERGIES:  No Known Allergies    MEDICATIONS  (STANDING):  clonazePAM Oral Disintegrating Tablet 0.25 milliGRAM(s) Oral two times a day  CREXONT 70mg/280 mg Extended release cap 2 Capsule(s)   Oral four times a day  dextrose 5%. 1000 milliLiter(s) (50 mL/Hr) IV Continuous <Continuous>  dextrose 5%. 1000 milliLiter(s) (100 mL/Hr) IV Continuous <Continuous>  dextrose 50% Injectable 25 Gram(s) IV Push once  dextrose 50% Injectable 12.5 Gram(s) IV Push once  dextrose 50% Injectable 25 Gram(s) IV Push once  enoxaparin Injectable 70 milliGRAM(s) SubCutaneous every 12 hours  glucagon  Injectable 1 milliGRAM(s) IntraMuscular once  insulin lispro (ADMELOG) corrective regimen sliding scale   SubCutaneous three times a day before meals  insulin lispro (ADMELOG) corrective regimen sliding scale   SubCutaneous at bedtime  metoprolol tartrate 50 milliGRAM(s) Oral two times a day    MEDICATIONS  (PRN):  acetaminophen     Tablet .. 650 milliGRAM(s) Oral every 6 hours PRN Temp greater or equal to 38C (100.4F), Mild Pain (1 - 3)  aluminum hydroxide/magnesium hydroxide/simethicone Suspension 30 milliLiter(s) Oral every 4 hours PRN Dyspepsia  dextrose Oral Gel 15 Gram(s) Oral once PRN Blood Glucose LESS THAN 70 milliGRAM(s)/deciliter  melatonin 3 milliGRAM(s) Oral at bedtime PRN Insomnia  ondansetron Injectable 4 milliGRAM(s) IV Push every 8 hours PRN Nausea and/or Vomiting  zolpidem 5 milliGRAM(s) Oral at bedtime PRN Insomnia    Vital Signs Last 24 Hrs  T(F): 97.4 (14 Feb 2025 00:58), Max: 98 (13 Feb 2025 15:55)  HR: 82 (14 Feb 2025 00:58) (82 - 94)  BP: 114/65 (14 Feb 2025 00:58) (105/72 - 114/65)  RR: 22 (14 Feb 2025 00:58) (18 - 22)  SpO2: 96% (14 Feb 2025 00:58) (95% - 96%)  I&O's Summary    PHYSICAL EXAM:  General: NAD, A/O x 1  Lungs: Clear to auscultation bilaterally, good air entry, non-labored breathing  Extremities: No calf tenderness, No pitting edema    LABS:                        12.4   4.42  )-----------( 243      ( 13 Feb 2025 18:00 )             36.1     02-13    131  |  96  |  33  ----------------------------<  96  3.8   |  24  |  0.8    Ca    8.2      13 Feb 2025 18:00    TPro  5.4  /  Alb  3.6  /  TBili  0.9  /  DBili  x   /  AST  9   /  ALT  <5  /  AlkPhos  61  02-13      Lipase: 40 U/L (02-13-25 @ 18:00)      PT/INR - ( 13 Feb 2025 21:38 )   PT: 12.30 sec;   INR: 1.04 ratio         PTT - ( 13 Feb 2025 21:38 )  PTT:30.2 sec  Lactate, Blood: 1.5 mmol/L (02-13 @ 18:00)  POCT Blood Glucose.: 91 mg/dL (14 Feb 2025 00:56)      Urinalysis Basic - ( 13 Feb 2025 18:01 )    Color: Dark Yellow / Appearance: Clear / SG: >1.030 / pH: x  Gluc: x / Ketone: 15 mg/dL  / Bili: Moderate / Urobili: 1.0 mg/dL   Blood: x / Protein: 30 mg/dL / Nitrite: Negative   Leuk Esterase: Trace / RBC: 2 /HPF / WBC 5 /HPF   Sq Epi: x / Non Sq Epi: >36 /HPF / Bacteria: Many /HPF   (14 Feb 2025 02:06)      PAST MEDICAL & SURGICAL HISTORY:  Parkinsons      Hypertension      Hyperlipidemia      Diabetes          SOCIAL HX:   Smoking  None                       ETOH                            Other    FAMILY HISTORY:  .  No cardiovascular or pulmonary family history     REVIEW OF SYSTEMS:    All ROS are negative exept per HPI       Allergies    No Known Allergies          PHYSICAL EXAM  Vital Signs Last 24 Hrs  T(C): 37.1 (14 Feb 2025 09:08), Max: 37.2 (14 Feb 2025 05:37)  T(F): 98.8 (14 Feb 2025 09:08), Max: 99 (14 Feb 2025 05:37)  HR: 83 (14 Feb 2025 09:08) (82 - 130)  BP: 135/81 (14 Feb 2025 09:08) (105/72 - 161/82)  BP(mean): 99 (14 Feb 2025 09:08) (99 - 99)  RR: 18 (14 Feb 2025 09:08) (18 - 22)  SpO2: 97% (14 Feb 2025 09:08) (95% - 98%)    Parameters below as of 14 Feb 2025 09:08  Patient On (Oxygen Delivery Method): room air        CONSTITUTIONAL:  Well nourished.  NAD            CARDIAC:   no  LE edema      RESPIRATORY:   No wheezing   Normal chest expansion  Not tachypneic,  No use of accessory muscles    NEUROLOGICAL:   Alert and oriented x 1      LABS:                          12.1   5.85  )-----------( 229      ( 14 Feb 2025 06:08 )             36.2                                               02-14    135  |  97[L]  |  22[H]  ----------------------------<  77  3.7   |  21  |  0.7    Ca    8.2[L]      14 Feb 2025 06:08  Mg     1.5     02-14    TPro  5.4[L]  /  Alb  3.6  /  TBili  0.8  /  DBili  x   /  AST  9   /  ALT  <5  /  AlkPhos  61  02-14      PT/INR - ( 13 Feb 2025 21:38 )   PT: 12.30 sec;   INR: 1.04 ratio         PTT - ( 13 Feb 2025 21:38 )  PTT:30.2 sec                                       Urinalysis Basic - ( 14 Feb 2025 06:08 )    Color: x / Appearance: x / SG: x / pH: x  Gluc: 77 mg/dL / Ketone: x  / Bili: x / Urobili: x   Blood: x / Protein: x / Nitrite: x   Leuk Esterase: x / RBC: x / WBC x   Sq Epi: x / Non Sq Epi: x / Bacteria: x                                                  LIVER FUNCTIONS - ( 14 Feb 2025 06:08 )  Alb: 3.6 g/dL / Pro: 5.4 g/dL / ALK PHOS: 61 U/L / ALT: <5 U/L / AST: 9 U/L / GGT: x                                                  Urinalysis with Rflx Culture (collected 13 Feb 2025 18:01)                                                    MEDICATIONS  (STANDING):  cefTRIAXone   IVPB 1000 milliGRAM(s) IV Intermittent <User Schedule>  clonazePAM Oral Disintegrating Tablet 0.25 milliGRAM(s) Oral two times a day  CREXONT 70mg/280mg Extended release caps 2 Capsule(s) 2 Capsule(s) Oral four times a day  dextrose 5%. 1000 milliLiter(s) (50 mL/Hr) IV Continuous <Continuous>  dextrose 5%. 1000 milliLiter(s) (100 mL/Hr) IV Continuous <Continuous>  dextrose 50% Injectable 25 Gram(s) IV Push once  dextrose 50% Injectable 12.5 Gram(s) IV Push once  dextrose 50% Injectable 25 Gram(s) IV Push once  enoxaparin Injectable 70 milliGRAM(s) SubCutaneous every 12 hours  glucagon  Injectable 1 milliGRAM(s) IntraMuscular once  insulin lispro (ADMELOG) corrective regimen sliding scale   SubCutaneous three times a day before meals  insulin lispro (ADMELOG) corrective regimen sliding scale   SubCutaneous at bedtime  metoprolol tartrate 50 milliGRAM(s) Oral two times a day    MEDICATIONS  (PRN):  acetaminophen     Tablet .. 650 milliGRAM(s) Oral every 6 hours PRN Temp greater or equal to 38C (100.4F), Mild Pain (1 - 3)  aluminum hydroxide/magnesium hydroxide/simethicone Suspension 30 milliLiter(s) Oral every 4 hours PRN Dyspepsia  dextrose Oral Gel 15 Gram(s) Oral once PRN Blood Glucose LESS THAN 70 milliGRAM(s)/deciliter  melatonin 3 milliGRAM(s) Oral at bedtime PRN Insomnia  ondansetron Injectable 4 milliGRAM(s) IV Push every 8 hours PRN Nausea and/or Vomiting  zolpidem 5 milliGRAM(s) Oral at bedtime PRN Insomnia      X-Rays reviewed:    CXR interpreted by me:

## 2025-02-14 NOTE — CONSULT NOTE ADULT - SUBJECTIVE AND OBJECTIVE BOX
INTERVENTIONAL RADIOLOGY CONSULT:     Procedure Requested:  PERT    HPI:  73 y/o female PMH Parkinson's, DM, HTN, HLD presents for evaluation of Altered Mental Status. Per family at bedside, the pt had an incident a few days ago where she was visibly blue and unresponsive that lasted a few minutes 4 days ago. This resolved spontaneously. However, patient also started complaining of dysuria, increased pressure in the lower abdomen, dark urine and some right flank pain on that day. She has also been noted to be acting more confused than baseline and not making sense when she speaks. She endorses chills but no fevers at home. She denies chest pain or any shortness of breath. No recent illnesses or sick contacts. Upon my assessment, patient is pleasant but confused, says she's feeling well, but not giving meaningful responses to other questions.  In the ED, /72, HR 94, RR 18, satting 95% on RA, afebrile  Coags wnl, Trop 14, , UA pos but contaminated, lactate 1.5, lipase 40, Na 131, K 3.8, Cr 0.8, LFTs wnl, WBC 4.4, Hgb 12.4,   CXR wnl. CT head- No evidence of acute transcortical infarct, acute intracranial hemorrhage, or mass effect.  CTAP- 2.  No CT evidence of an acute abdominopelvic pathology.  CTPE- Acute pulmonary emboli with evidence of right heart strain, correlating with 2/13/2025 CT: Distal left main pulmonary artery extending into the anterior segmental branch. Left lower lobe segmental branch. Right middle lobe and right lower lobe segmental branches.  Given lovenox, rocephin, IVF and admitted to medicine for acute PEs with RHS    ALLERGIES:  No Known Allergies    MEDICATIONS  (STANDING):  clonazePAM Oral Disintegrating Tablet 0.25 milliGRAM(s) Oral two times a day  CREXONT 70mg/280 mg Extended release cap 2 Capsule(s)   Oral four times a day  dextrose 5%. 1000 milliLiter(s) (50 mL/Hr) IV Continuous <Continuous>  dextrose 5%. 1000 milliLiter(s) (100 mL/Hr) IV Continuous <Continuous>  dextrose 50% Injectable 25 Gram(s) IV Push once  dextrose 50% Injectable 12.5 Gram(s) IV Push once  dextrose 50% Injectable 25 Gram(s) IV Push once  enoxaparin Injectable 70 milliGRAM(s) SubCutaneous every 12 hours  glucagon  Injectable 1 milliGRAM(s) IntraMuscular once  insulin lispro (ADMELOG) corrective regimen sliding scale   SubCutaneous three times a day before meals  insulin lispro (ADMELOG) corrective regimen sliding scale   SubCutaneous at bedtime  metoprolol tartrate 50 milliGRAM(s) Oral two times a day    MEDICATIONS  (PRN):  acetaminophen     Tablet .. 650 milliGRAM(s) Oral every 6 hours PRN Temp greater or equal to 38C (100.4F), Mild Pain (1 - 3)  aluminum hydroxide/magnesium hydroxide/simethicone Suspension 30 milliLiter(s) Oral every 4 hours PRN Dyspepsia  dextrose Oral Gel 15 Gram(s) Oral once PRN Blood Glucose LESS THAN 70 milliGRAM(s)/deciliter  melatonin 3 milliGRAM(s) Oral at bedtime PRN Insomnia  ondansetron Injectable 4 milliGRAM(s) IV Push every 8 hours PRN Nausea and/or Vomiting  zolpidem 5 milliGRAM(s) Oral at bedtime PRN Insomnia    Vital Signs Last 24 Hrs  T(F): 97.4 (14 Feb 2025 00:58), Max: 98 (13 Feb 2025 15:55)  HR: 82 (14 Feb 2025 00:58) (82 - 94)  BP: 114/65 (14 Feb 2025 00:58) (105/72 - 114/65)  RR: 22 (14 Feb 2025 00:58) (18 - 22)  SpO2: 96% (14 Feb 2025 00:58) (95% - 96%)  I&O's Summary    PHYSICAL EXAM:  General: NAD, A/O x 1  ENT: Moist mucous membranes, no thrush  Neck: Supple, No JVD  Lungs: Clear to auscultation bilaterally, good air entry, non-labored breathing  Cardio: RRR, S1/S2, No murmur  Abdomen: Soft, Nontender, Nondistended; Bowel sounds present  Extremities: No calf tenderness, No pitting edema    LABS:                        12.4   4.42  )-----------( 243      ( 13 Feb 2025 18:00 )             36.1     02-13    131  |  96  |  33  ----------------------------<  96  3.8   |  24  |  0.8    Ca    8.2      13 Feb 2025 18:00    TPro  5.4  /  Alb  3.6  /  TBili  0.9  /  DBili  x   /  AST  9   /  ALT  <5  /  AlkPhos  61  02-13      Lipase: 40 U/L (02-13-25 @ 18:00)      PT/INR - ( 13 Feb 2025 21:38 )   PT: 12.30 sec;   INR: 1.04 ratio         PTT - ( 13 Feb 2025 21:38 )  PTT:30.2 sec  Lactate, Blood: 1.5 mmol/L (02-13 @ 18:00)  POCT Blood Glucose.: 91 mg/dL (14 Feb 2025 00:56)      Urinalysis Basic - ( 13 Feb 2025 18:01 )    Color: Dark Yellow / Appearance: Clear / SG: >1.030 / pH: x  Gluc: x / Ketone: 15 mg/dL  / Bili: Moderate / Urobili: 1.0 mg/dL   Blood: x / Protein: 30 mg/dL / Nitrite: Negative   Leuk Esterase: Trace / RBC: 2 /HPF / WBC 5 /HPF   Sq Epi: x / Non Sq Epi: >36 /HPF / Bacteria: Many /HPF   (14 Feb 2025 02:06)      PAST MEDICAL & SURGICAL HISTORY:  Parkinsons      Hypertension      Hyperlipidemia      Diabetes          MEDICATIONS  (STANDING):  cefTRIAXone   IVPB 1000 milliGRAM(s) IV Intermittent <User Schedule>  clonazePAM Oral Disintegrating Tablet 0.25 milliGRAM(s) Oral two times a day  CREXONT 70mg/280mg Extended release caps 2 Capsule(s) 2 Capsule(s) Oral four times a day  dextrose 5%. 1000 milliLiter(s) (50 mL/Hr) IV Continuous <Continuous>  dextrose 5%. 1000 milliLiter(s) (100 mL/Hr) IV Continuous <Continuous>  dextrose 50% Injectable 25 Gram(s) IV Push once  dextrose 50% Injectable 12.5 Gram(s) IV Push once  dextrose 50% Injectable 25 Gram(s) IV Push once  enoxaparin Injectable 70 milliGRAM(s) SubCutaneous every 12 hours  glucagon  Injectable 1 milliGRAM(s) IntraMuscular once  insulin lispro (ADMELOG) corrective regimen sliding scale   SubCutaneous three times a day before meals  insulin lispro (ADMELOG) corrective regimen sliding scale   SubCutaneous at bedtime  metoprolol tartrate 50 milliGRAM(s) Oral two times a day    MEDICATIONS  (PRN):  acetaminophen     Tablet .. 650 milliGRAM(s) Oral every 6 hours PRN Temp greater or equal to 38C (100.4F), Mild Pain (1 - 3)  aluminum hydroxide/magnesium hydroxide/simethicone Suspension 30 milliLiter(s) Oral every 4 hours PRN Dyspepsia  dextrose Oral Gel 15 Gram(s) Oral once PRN Blood Glucose LESS THAN 70 milliGRAM(s)/deciliter  melatonin 3 milliGRAM(s) Oral at bedtime PRN Insomnia  ondansetron Injectable 4 milliGRAM(s) IV Push every 8 hours PRN Nausea and/or Vomiting  zolpidem 5 milliGRAM(s) Oral at bedtime PRN Insomnia      Allergies    No Known Allergies      Labs:                         12.1   5.85  )-----------( 229      ( 14 Feb 2025 06:08 )             36.2     02-14    135  |  97[L]  |  22[H]  ----------------------------<  77  3.7   |  21  |  0.7    Ca    8.2[L]      14 Feb 2025 06:08  Mg     1.5     02-14    TPro  5.4[L]  /  Alb  3.6  /  TBili  0.8  /  DBili  x   /  AST  9   /  ALT  <5  /  AlkPhos  61  02-14    PT/INR - ( 13 Feb 2025 21:38 )   PT: 12.30 sec;   INR: 1.04 ratio         PTT - ( 13 Feb 2025 21:38 )  PTT:30.2 sec      Radiology & Additional Studies:     Radiology imaging reviewed.       ASSESSMENT/ PLAN:       Acute Segmental PE with Positvie trops, negative BNP.  1.. Echo  2. DVT study  3. IV heparin    Thank you for the courtesy of this consult, please call [phone] with any further questions.

## 2025-02-15 LAB
ALBUMIN SERPL ELPH-MCNC: 3.5 G/DL — SIGNIFICANT CHANGE UP (ref 3.5–5.2)
ALP SERPL-CCNC: 64 U/L — SIGNIFICANT CHANGE UP (ref 30–115)
ALT FLD-CCNC: <5 U/L — SIGNIFICANT CHANGE UP (ref 0–41)
ANION GAP SERPL CALC-SCNC: 16 MMOL/L — HIGH (ref 7–14)
AST SERPL-CCNC: 11 U/L — SIGNIFICANT CHANGE UP (ref 0–41)
BASOPHILS # BLD AUTO: 0.03 K/UL — SIGNIFICANT CHANGE UP (ref 0–0.2)
BASOPHILS NFR BLD AUTO: 0.4 % — SIGNIFICANT CHANGE UP (ref 0–1)
BILIRUB SERPL-MCNC: 0.6 MG/DL — SIGNIFICANT CHANGE UP (ref 0.2–1.2)
BUN SERPL-MCNC: 16 MG/DL — SIGNIFICANT CHANGE UP (ref 10–20)
CALCIUM SERPL-MCNC: 8.5 MG/DL — SIGNIFICANT CHANGE UP (ref 8.4–10.5)
CHLORIDE SERPL-SCNC: 99 MMOL/L — SIGNIFICANT CHANGE UP (ref 98–110)
CO2 SERPL-SCNC: 22 MMOL/L — SIGNIFICANT CHANGE UP (ref 17–32)
CREAT SERPL-MCNC: 0.7 MG/DL — SIGNIFICANT CHANGE UP (ref 0.7–1.5)
CULTURE RESULTS: SIGNIFICANT CHANGE UP
EGFR: 91 ML/MIN/1.73M2 — SIGNIFICANT CHANGE UP
EOSINOPHIL # BLD AUTO: 0.05 K/UL — SIGNIFICANT CHANGE UP (ref 0–0.7)
EOSINOPHIL NFR BLD AUTO: 0.7 % — SIGNIFICANT CHANGE UP (ref 0–8)
GLUCOSE BLDC GLUCOMTR-MCNC: 117 MG/DL — HIGH (ref 70–99)
GLUCOSE BLDC GLUCOMTR-MCNC: 117 MG/DL — HIGH (ref 70–99)
GLUCOSE BLDC GLUCOMTR-MCNC: 84 MG/DL — SIGNIFICANT CHANGE UP (ref 70–99)
GLUCOSE SERPL-MCNC: 92 MG/DL — SIGNIFICANT CHANGE UP (ref 70–99)
HCT VFR BLD CALC: 36.6 % — LOW (ref 37–47)
HGB BLD-MCNC: 12.5 G/DL — SIGNIFICANT CHANGE UP (ref 12–16)
IMM GRANULOCYTES NFR BLD AUTO: 1.2 % — HIGH (ref 0.1–0.3)
LYMPHOCYTES # BLD AUTO: 2.02 K/UL — SIGNIFICANT CHANGE UP (ref 1.2–3.4)
LYMPHOCYTES # BLD AUTO: 27.6 % — SIGNIFICANT CHANGE UP (ref 20.5–51.1)
MAGNESIUM SERPL-MCNC: 2 MG/DL — SIGNIFICANT CHANGE UP (ref 1.8–2.4)
MCHC RBC-ENTMCNC: 30.3 PG — SIGNIFICANT CHANGE UP (ref 27–31)
MCHC RBC-ENTMCNC: 34.2 G/DL — SIGNIFICANT CHANGE UP (ref 32–37)
MCV RBC AUTO: 88.8 FL — SIGNIFICANT CHANGE UP (ref 81–99)
MONOCYTES # BLD AUTO: 0.43 K/UL — SIGNIFICANT CHANGE UP (ref 0.1–0.6)
MONOCYTES NFR BLD AUTO: 5.9 % — SIGNIFICANT CHANGE UP (ref 1.7–9.3)
NEUTROPHILS # BLD AUTO: 4.71 K/UL — SIGNIFICANT CHANGE UP (ref 1.4–6.5)
NEUTROPHILS NFR BLD AUTO: 64.2 % — SIGNIFICANT CHANGE UP (ref 42.2–75.2)
NRBC BLD AUTO-RTO: 0 /100 WBCS — SIGNIFICANT CHANGE UP (ref 0–0)
PLATELET # BLD AUTO: 264 K/UL — SIGNIFICANT CHANGE UP (ref 130–400)
PMV BLD: 11.1 FL — HIGH (ref 7.4–10.4)
POTASSIUM SERPL-MCNC: 3.7 MMOL/L — SIGNIFICANT CHANGE UP (ref 3.5–5)
POTASSIUM SERPL-SCNC: 3.7 MMOL/L — SIGNIFICANT CHANGE UP (ref 3.5–5)
PROT SERPL-MCNC: 5.3 G/DL — LOW (ref 6–8)
RBC # BLD: 4.12 M/UL — LOW (ref 4.2–5.4)
RBC # FLD: 13.1 % — SIGNIFICANT CHANGE UP (ref 11.5–14.5)
SODIUM SERPL-SCNC: 137 MMOL/L — SIGNIFICANT CHANGE UP (ref 135–146)
SPECIMEN SOURCE: SIGNIFICANT CHANGE UP
TROPONIN T, HIGH SENSITIVITY RESULT: 26 NG/L — HIGH (ref 6–13)
WBC # BLD: 7.33 K/UL — SIGNIFICANT CHANGE UP (ref 4.8–10.8)
WBC # FLD AUTO: 7.33 K/UL — SIGNIFICANT CHANGE UP (ref 4.8–10.8)

## 2025-02-15 PROCEDURE — 99232 SBSQ HOSP IP/OBS MODERATE 35: CPT

## 2025-02-15 RX ORDER — SENNA 187 MG
2 TABLET ORAL AT BEDTIME
Refills: 0 | Status: DISCONTINUED | OUTPATIENT
Start: 2025-02-15 | End: 2025-02-20

## 2025-02-15 RX ORDER — POLYETHYLENE GLYCOL 3350 17 G/17G
17 POWDER, FOR SOLUTION ORAL
Refills: 0 | Status: DISCONTINUED | OUTPATIENT
Start: 2025-02-15 | End: 2025-02-20

## 2025-02-15 RX ADMIN — CEFTRIAXONE 100 MILLIGRAM(S): 500 INJECTION, POWDER, FOR SOLUTION INTRAMUSCULAR; INTRAVENOUS at 21:32

## 2025-02-15 RX ADMIN — CLONAZEPAM 0.25 MILLIGRAM(S): 0.5 TABLET ORAL at 06:02

## 2025-02-15 RX ADMIN — ENOXAPARIN SODIUM 70 MILLIGRAM(S): 100 INJECTION SUBCUTANEOUS at 18:05

## 2025-02-15 RX ADMIN — ENOXAPARIN SODIUM 70 MILLIGRAM(S): 100 INJECTION SUBCUTANEOUS at 06:02

## 2025-02-15 RX ADMIN — METOPROLOL SUCCINATE 50 MILLIGRAM(S): 50 TABLET, EXTENDED RELEASE ORAL at 06:02

## 2025-02-15 RX ADMIN — METOPROLOL SUCCINATE 50 MILLIGRAM(S): 50 TABLET, EXTENDED RELEASE ORAL at 18:05

## 2025-02-15 RX ADMIN — POLYETHYLENE GLYCOL 3350 17 GRAM(S): 17 POWDER, FOR SOLUTION ORAL at 13:37

## 2025-02-15 RX ADMIN — Medication 2 TABLET(S): at 21:32

## 2025-02-15 RX ADMIN — CLONAZEPAM 0.25 MILLIGRAM(S): 0.5 TABLET ORAL at 18:05

## 2025-02-15 RX ADMIN — POLYETHYLENE GLYCOL 3350 17 GRAM(S): 17 POWDER, FOR SOLUTION ORAL at 18:05

## 2025-02-15 NOTE — PROGRESS NOTE ADULT - ASSESSMENT
73 y/o female PMH Parkinson's, DM, HTN, HLD presents for evaluation of Altered Mental Status. Found to have acute pulmonary embolism.        #Acute Bilateral PEs with RHS  CT Chest PE protocol showing Acute PE with evidence of right heart strain  -No intervention per IR   -Lovenox therapeutic  -Eliquis on discharge      #Metabolic Encephalopathic  #Suspected cystitis  #Delirium  CTH unremarkable  - fu UCx  - Cont IV Rocephin  - rEEG    #Parkinson  -C/W crexont, clonazepam, ambien for eszipiclone    #DM  #HTN  #HLD  -ISS for metformin, A1C  -Lopressor 50 BID for nebivolol 5 BID    #DVT PPX- Lovenox    #Progress Note Handoff  Pending (specify): Cont therapeutic lovenox,  fu UCx, PT eval

## 2025-02-15 NOTE — PATIENT PROFILE ADULT - FUNCTIONAL ASSESSMENT - BASIC MOBILITY 2.
Hospitalist Discharge Summary     Patient ID:  Jorge Luis Dominguez  [id number]  32 y.o.  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  male with PMHx as listed below presents emergency department with complaints of dizziness (lightheadedness), fever/chills, rigors, left lower extremity redness swelling and pain has progressed over the past 2-3 days with acute decline over the past 24 hours associated with increasing lethargy/malaise and poor p.o. intake. ROS otherwise negative. Patient denies sick contacts. Works in road maintenance/cleaning. Denies tobacco, alcohol, illicit drugs. In the ED, patient afebrile, tachycardic to 110s, normotensive, saturating mid 90s on room air. CXR demonstrates moderate bilateral pulmonary edema versus pneumonia. Left lower extremity DVT study negative. ECG demonstrates sinus tachycardia without definitive ischemic change. Labs demonstrate: WBC 29.5 (89% PMNs), hemoglobin 14.5, platelets 709, procalcitonin 0.58, high sensitive troponin 7, lactic acid 0.95, sodium 132, potassium 4.1, glucose 137, BUN 11, creatinine 1.17. Patient started on empiric vancomycin and Zosyn by ED provider. We were asked to admit for work up and evaluation of the above problems.      DISCHARGE SUMMARY/HOSPITAL COURSE: 2 = A lot of assistance

## 2025-02-15 NOTE — PROGRESS NOTE ADULT - SUBJECTIVE AND OBJECTIVE BOX
Pt seen and examined at bedside. Awake, alert. AOx2 (not aware of place).         VITAL SIGNS (Last 24 hrs):  T(C): 36.4 (02-15-25 @ 05:33), Max: 36.8 (25 @ 21:40)  HR: 74 (02-15-25 @ 05:33) (74 - 99)  BP: 112/64 (02-15-25 @ 05:33) (112/64 - 127/68)  RR: 18 (02-15-25 @ 08:42) (17 - 21)  SpO2: 96% (02-15-25 @ 08:42) (94% - 97%)  Wt(kg): --  Daily     Daily Weight in k.8 (15 Feb 2025 04:34)    I&O's Summary    2025 07:01  -  15 Feb 2025 07:00  --------------------------------------------------------  IN: 100 mL / OUT: 200 mL / NET: -100 mL    15 Feb 2025 07:01  -  15 Feb 2025 12:50  --------------------------------------------------------  IN: 100 mL / OUT: 0 mL / NET: 100 mL        PHYSICAL EXAM:  GENERAL: NAD   HEAD:  Atraumatic, Normocephalic  EYES: conjunctiva and sclera clear  NECK: Supple, No JVD  CHEST/LUNG: Clear to auscultation bilaterally; No wheeze  HEART: Regular rate and rhythm; No murmurs, rubs, or gallops  ABDOMEN: Soft, Nontender, Nondistended; Bowel sounds present  EXTREMITIES:  2+ Peripheral Pulses, No clubbing, cyanosis, or edema  SKIN: No rashes or lesions    Labs Reviewed       CBC Full  -  ( 15 Feb 2025 07:55 )  WBC Count : 7.33 K/uL  Hemoglobin : 12.5 g/dL  Hematocrit : 36.6 %  Platelet Count - Automated : 264 K/uL  Mean Cell Volume : 88.8 fL  Mean Cell Hemoglobin : 30.3 pg  Mean Cell Hemoglobin Concentration : 34.2 g/dL  Auto Neutrophil # : x  Auto Lymphocyte # : x  Auto Monocyte # : x  Auto Eosinophil # : x  Auto Basophil # : x  Auto Neutrophil % : x  Auto Lymphocyte % : x  Auto Monocyte % : x  Auto Eosinophil % : x  Auto Basophil % : x    BMP:    02-15 @ 07:55    Blood Urea Nitrogen - 16  Calcium - 8.5  Carbond Dioxide - 22  Chloride - 99  Creatinine - 0.7  Glucose - 92  Potassium - 3.7  Sodium - 137      Hemoglobin A1c -   PT/INR - ( 2025 21:38 )   PT: 12.30 sec;   INR: 1.04 ratio         PTT - ( 2025 21:38 )  PTT:30.2 sec  Urine Culture:   @ 03:12 Urine culture: --    Culture Results:   <10,000 CFU/mL Normal Urogenital Margaret  Method Type: --  Organism: --  Organism Identification: --  Specimen Source: Clean Catch Clean Catch (Midstream)            MEDICATIONS  (STANDING):  cefTRIAXone   IVPB 1000 milliGRAM(s) IV Intermittent <User Schedule>  clonazePAM Oral Disintegrating Tablet 0.25 milliGRAM(s) Oral two times a day  CREXONT 70mg/280mg Extended release caps 2 Capsule(s) 2 Capsule(s) Oral four times a day  dextrose 5%. 1000 milliLiter(s) (50 mL/Hr) IV Continuous <Continuous>  dextrose 5%. 1000 milliLiter(s) (100 mL/Hr) IV Continuous <Continuous>  dextrose 50% Injectable 25 Gram(s) IV Push once  dextrose 50% Injectable 12.5 Gram(s) IV Push once  dextrose 50% Injectable 25 Gram(s) IV Push once  enoxaparin Injectable 70 milliGRAM(s) SubCutaneous every 12 hours  glucagon  Injectable 1 milliGRAM(s) IntraMuscular once  insulin lispro (ADMELOG) corrective regimen sliding scale   SubCutaneous three times a day before meals  insulin lispro (ADMELOG) corrective regimen sliding scale   SubCutaneous at bedtime  metoprolol tartrate 50 milliGRAM(s) Oral two times a day  polyethylene glycol 3350 17 Gram(s) Oral two times a day  senna 2 Tablet(s) Oral at bedtime    MEDICATIONS  (PRN):  acetaminophen     Tablet .. 650 milliGRAM(s) Oral every 6 hours PRN Temp greater or equal to 38C (100.4F), Mild Pain (1 - 3)  aluminum hydroxide/magnesium hydroxide/simethicone Suspension 30 milliLiter(s) Oral every 4 hours PRN Dyspepsia  dextrose Oral Gel 15 Gram(s) Oral once PRN Blood Glucose LESS THAN 70 milliGRAM(s)/deciliter  melatonin 3 milliGRAM(s) Oral at bedtime PRN Insomnia  ondansetron Injectable 4 milliGRAM(s) IV Push every 8 hours PRN Nausea and/or Vomiting  zolpidem 5 milliGRAM(s) Oral at bedtime PRN Insomnia

## 2025-02-15 NOTE — PATIENT PROFILE ADULT - FALL HARM RISK - HARM RISK INTERVENTIONS
Assistance OOB with selected safe patient handling equipment/Communicate Risk of Fall with Harm to all staff/Monitor for mental status changes/Move patient closer to nurses' station/Reinforce activity limits and safety measures with patient and family/Reorient to person, place and time as needed/Tailored Fall Risk Interventions/Toileting schedule using arm’s reach rule for commode and bathroom/Use of alarms - bed, chair and/or voice tab/Visual Cue: Yellow wristband and red socks/Bed in lowest position, wheels locked, appropriate side rails in place/Call bell, personal items and telephone in reach/Instruct patient to call for assistance before getting out of bed or chair/Non-slip footwear when patient is out of bed/[place] to call system/Physically safe environment - no spills, clutter or unnecessary equipment/Purposeful Proactive Rounding/Room/bathroom lighting operational, light cord in reach Assistance with ambulation/Assistance OOB with selected safe patient handling equipment/Communicate Risk of Fall with Harm to all staff/Discuss with provider need for PT consult/Monitor for mental status changes/Monitor gait and stability/Move patient closer to nurses' station/Provide patient with walking aids - walker, cane, crutches/Reinforce activity limits and safety measures with patient and family/Reorient to person, place and time as needed/Tailored Fall Risk Interventions/Toileting schedule using arm’s reach rule for commode and bathroom/Use of alarms - bed, chair and/or voice tab/Visual Cue: Yellow wristband and red socks/Bed in lowest position, wheels locked, appropriate side rails in place/Call bell, personal items and telephone in reach/Instruct patient to call for assistance before getting out of bed or chair/Non-slip footwear when patient is out of bed/[place] to call system/Physically safe environment - no spills, clutter or unnecessary equipment/Purposeful Proactive Rounding/Room/bathroom lighting operational, light cord in reach

## 2025-02-16 LAB
ALBUMIN SERPL ELPH-MCNC: 3.4 G/DL — LOW (ref 3.5–5.2)
ALP SERPL-CCNC: 63 U/L — SIGNIFICANT CHANGE UP (ref 30–115)
ALT FLD-CCNC: <5 U/L — SIGNIFICANT CHANGE UP (ref 0–41)
ANION GAP SERPL CALC-SCNC: 15 MMOL/L — HIGH (ref 7–14)
AST SERPL-CCNC: 12 U/L — SIGNIFICANT CHANGE UP (ref 0–41)
BASOPHILS # BLD AUTO: 0.02 K/UL — SIGNIFICANT CHANGE UP (ref 0–0.2)
BASOPHILS NFR BLD AUTO: 0.3 % — SIGNIFICANT CHANGE UP (ref 0–1)
BILIRUB SERPL-MCNC: 0.5 MG/DL — SIGNIFICANT CHANGE UP (ref 0.2–1.2)
BUN SERPL-MCNC: 18 MG/DL — SIGNIFICANT CHANGE UP (ref 10–20)
CALCIUM SERPL-MCNC: 8.5 MG/DL — SIGNIFICANT CHANGE UP (ref 8.4–10.5)
CHLORIDE SERPL-SCNC: 101 MMOL/L — SIGNIFICANT CHANGE UP (ref 98–110)
CO2 SERPL-SCNC: 22 MMOL/L — SIGNIFICANT CHANGE UP (ref 17–32)
CREAT SERPL-MCNC: 0.8 MG/DL — SIGNIFICANT CHANGE UP (ref 0.7–1.5)
EGFR: 78 ML/MIN/1.73M2 — SIGNIFICANT CHANGE UP
EOSINOPHIL # BLD AUTO: 0.05 K/UL — SIGNIFICANT CHANGE UP (ref 0–0.7)
EOSINOPHIL NFR BLD AUTO: 0.7 % — SIGNIFICANT CHANGE UP (ref 0–8)
GLUCOSE BLDC GLUCOMTR-MCNC: 107 MG/DL — HIGH (ref 70–99)
GLUCOSE BLDC GLUCOMTR-MCNC: 109 MG/DL — HIGH (ref 70–99)
GLUCOSE BLDC GLUCOMTR-MCNC: 119 MG/DL — HIGH (ref 70–99)
GLUCOSE BLDC GLUCOMTR-MCNC: 98 MG/DL — SIGNIFICANT CHANGE UP (ref 70–99)
GLUCOSE SERPL-MCNC: 106 MG/DL — HIGH (ref 70–99)
HCT VFR BLD CALC: 35.8 % — LOW (ref 37–47)
HGB BLD-MCNC: 12.2 G/DL — SIGNIFICANT CHANGE UP (ref 12–16)
IMM GRANULOCYTES NFR BLD AUTO: 1.1 % — HIGH (ref 0.1–0.3)
LYMPHOCYTES # BLD AUTO: 1.54 K/UL — SIGNIFICANT CHANGE UP (ref 1.2–3.4)
LYMPHOCYTES # BLD AUTO: 22 % — SIGNIFICANT CHANGE UP (ref 20.5–51.1)
MAGNESIUM SERPL-MCNC: 1.7 MG/DL — LOW (ref 1.8–2.4)
MCHC RBC-ENTMCNC: 30.4 PG — SIGNIFICANT CHANGE UP (ref 27–31)
MCHC RBC-ENTMCNC: 34.1 G/DL — SIGNIFICANT CHANGE UP (ref 32–37)
MCV RBC AUTO: 89.3 FL — SIGNIFICANT CHANGE UP (ref 81–99)
MONOCYTES # BLD AUTO: 0.35 K/UL — SIGNIFICANT CHANGE UP (ref 0.1–0.6)
MONOCYTES NFR BLD AUTO: 5 % — SIGNIFICANT CHANGE UP (ref 1.7–9.3)
NEUTROPHILS # BLD AUTO: 4.96 K/UL — SIGNIFICANT CHANGE UP (ref 1.4–6.5)
NEUTROPHILS NFR BLD AUTO: 70.9 % — SIGNIFICANT CHANGE UP (ref 42.2–75.2)
NRBC BLD AUTO-RTO: 0 /100 WBCS — SIGNIFICANT CHANGE UP (ref 0–0)
PLATELET # BLD AUTO: 243 K/UL — SIGNIFICANT CHANGE UP (ref 130–400)
PMV BLD: 11.2 FL — HIGH (ref 7.4–10.4)
POTASSIUM SERPL-MCNC: 3.6 MMOL/L — SIGNIFICANT CHANGE UP (ref 3.5–5)
POTASSIUM SERPL-SCNC: 3.6 MMOL/L — SIGNIFICANT CHANGE UP (ref 3.5–5)
PROT SERPL-MCNC: 5.2 G/DL — LOW (ref 6–8)
RBC # BLD: 4.01 M/UL — LOW (ref 4.2–5.4)
RBC # FLD: 13.2 % — SIGNIFICANT CHANGE UP (ref 11.5–14.5)
SODIUM SERPL-SCNC: 138 MMOL/L — SIGNIFICANT CHANGE UP (ref 135–146)
WBC # BLD: 7 K/UL — SIGNIFICANT CHANGE UP (ref 4.8–10.8)
WBC # FLD AUTO: 7 K/UL — SIGNIFICANT CHANGE UP (ref 4.8–10.8)

## 2025-02-16 PROCEDURE — 99232 SBSQ HOSP IP/OBS MODERATE 35: CPT

## 2025-02-16 RX ADMIN — POLYETHYLENE GLYCOL 3350 17 GRAM(S): 17 POWDER, FOR SOLUTION ORAL at 06:15

## 2025-02-16 RX ADMIN — POLYETHYLENE GLYCOL 3350 17 GRAM(S): 17 POWDER, FOR SOLUTION ORAL at 17:15

## 2025-02-16 RX ADMIN — ENOXAPARIN SODIUM 70 MILLIGRAM(S): 100 INJECTION SUBCUTANEOUS at 17:15

## 2025-02-16 RX ADMIN — CLONAZEPAM 0.25 MILLIGRAM(S): 0.5 TABLET ORAL at 17:15

## 2025-02-16 RX ADMIN — METOPROLOL SUCCINATE 50 MILLIGRAM(S): 50 TABLET, EXTENDED RELEASE ORAL at 17:15

## 2025-02-16 RX ADMIN — ENOXAPARIN SODIUM 70 MILLIGRAM(S): 100 INJECTION SUBCUTANEOUS at 06:15

## 2025-02-16 RX ADMIN — METOPROLOL SUCCINATE 50 MILLIGRAM(S): 50 TABLET, EXTENDED RELEASE ORAL at 06:15

## 2025-02-16 RX ADMIN — CLONAZEPAM 0.25 MILLIGRAM(S): 0.5 TABLET ORAL at 06:26

## 2025-02-16 RX ADMIN — Medication 2 TABLET(S): at 21:26

## 2025-02-16 NOTE — PROGRESS NOTE ADULT - SUBJECTIVE AND OBJECTIVE BOX
Pt seen and examined at bedside. Remains AOx2. Confused about place.         VITAL SIGNS (Last 24 hrs):  T(C): 36.4 (25 @ 13:56), Max: 36.8 (02-15-25 @ 22:05)  HR: 80 (25 @ 13:56) (65 - 95)  BP: 130/80 (25 @ 13:56) (120/60 - 130/80)  RR: 18 (25 @ 13:56) (18 - 18)  SpO2: 95% (02-15-25 @ 22:05) (95% - 97%)  Wt(kg): --  Daily     Daily Weight in k (2025 04:48)    I&O's Summary    15 Feb 2025 07:01  -  2025 07:00  --------------------------------------------------------  IN: 570 mL / OUT: 200 mL / NET: 370 mL    2025 07:01  -  2025 14:56  --------------------------------------------------------  IN: 380 mL / OUT: 0 mL / NET: 380 mL        PHYSICAL EXAM:  GENERAL: NAD, well-developed  HEAD:  Atraumatic, Normocephalic  EYES: conjunctiva and sclera clear  NECK: Supple, No JVD  CHEST/LUNG: Clear to auscultation bilaterally; No wheeze  HEART: Regular rate and rhythm; No murmurs, rubs, or gallops  ABDOMEN: Soft, Nontender, Nondistended; Bowel sounds present  EXTREMITIES:  2+ Peripheral Pulses, No clubbing, cyanosis, or edema  SKIN: No rashes or lesions    Labs Reviewed        CBC Full  -  ( 2025 07:19 )  WBC Count : 7.00 K/uL  Hemoglobin : 12.2 g/dL  Hematocrit : 35.8 %  Platelet Count - Automated : 243 K/uL  Mean Cell Volume : 89.3 fL  Mean Cell Hemoglobin : 30.4 pg  Mean Cell Hemoglobin Concentration : 34.1 g/dL  Auto Neutrophil # : x  Auto Lymphocyte # : x  Auto Monocyte # : x  Auto Eosinophil # : x  Auto Basophil # : x  Auto Neutrophil % : x  Auto Lymphocyte % : x  Auto Monocyte % : x  Auto Eosinophil % : x  Auto Basophil % : x    BMP:    16 @ 07:19    Blood Urea Nitrogen - 18  Calcium - 8.5  Carbond Dioxide - 22  Chloride - 101  Creatinine - 0.8  Glucose - 106  Potassium - 3.6  Sodium - 138      Hemoglobin A1c -   PT/INR - ( 2025 21:38 )   PT: 12.30 sec;   INR: 1.04 ratio         PTT - ( 2025 21:38 )  PTT:30.2 sec  Urine Culture:   @ 03:12 Urine culture: --    Culture Results:   <10,000 CFU/mL Normal Urogenital Margaret  Method Type: --  Organism: --  Organism Identification: --  Specimen Source: Clean Catch Clean Catch (Midstream)            MEDICATIONS  (STANDING):  clonazePAM Oral Disintegrating Tablet 0.25 milliGRAM(s) Oral two times a day  CREXONT 70mg/280mg Extended release caps 2 Capsule(s) 2 Capsule(s) Oral four times a day  dextrose 5%. 1000 milliLiter(s) (50 mL/Hr) IV Continuous <Continuous>  dextrose 5%. 1000 milliLiter(s) (100 mL/Hr) IV Continuous <Continuous>  dextrose 50% Injectable 25 Gram(s) IV Push once  dextrose 50% Injectable 12.5 Gram(s) IV Push once  dextrose 50% Injectable 25 Gram(s) IV Push once  enoxaparin Injectable 70 milliGRAM(s) SubCutaneous every 12 hours  glucagon  Injectable 1 milliGRAM(s) IntraMuscular once  insulin lispro (ADMELOG) corrective regimen sliding scale   SubCutaneous three times a day before meals  insulin lispro (ADMELOG) corrective regimen sliding scale   SubCutaneous at bedtime  metoprolol tartrate 50 milliGRAM(s) Oral two times a day  polyethylene glycol 3350 17 Gram(s) Oral two times a day  senna 2 Tablet(s) Oral at bedtime    MEDICATIONS  (PRN):  acetaminophen     Tablet .. 650 milliGRAM(s) Oral every 6 hours PRN Temp greater or equal to 38C (100.4F), Mild Pain (1 - 3)  aluminum hydroxide/magnesium hydroxide/simethicone Suspension 30 milliLiter(s) Oral every 4 hours PRN Dyspepsia  dextrose Oral Gel 15 Gram(s) Oral once PRN Blood Glucose LESS THAN 70 milliGRAM(s)/deciliter  melatonin 3 milliGRAM(s) Oral at bedtime PRN Insomnia  ondansetron Injectable 4 milliGRAM(s) IV Push every 8 hours PRN Nausea and/or Vomiting  zolpidem 5 milliGRAM(s) Oral at bedtime PRN Insomnia

## 2025-02-16 NOTE — PROGRESS NOTE ADULT - ASSESSMENT
75 y/o female PMH Parkinson's, DM, HTN, HLD presents for evaluation of Altered Mental Status. Found to have acute pulmonary embolism.        #Acute Bilateral PEs with RHS  CT Chest PE protocol showing Acute PE with evidence of right heart strain  -No intervention per IR   -Lovenox therapeutic  -Eliquis on discharge      #Metabolic Encephalopathic  #Suspected cystitis  #Delirium  CTH unremarkable  - UCx negative   - Dc abx   - rEEG    #Parkinson  -C/W crexont, clonazepam, ambien for eszipiclone    #DM  #HTN  #HLD  -ISS for metformin, A1C  -Lopressor 50 BID for nebivolol 5 BID    #DVT PPX- Lovenox    #Progress Note Handoff  Pending (specify):  PT eval

## 2025-02-17 ENCOUNTER — TRANSCRIPTION ENCOUNTER (OUTPATIENT)
Age: 75
End: 2025-02-17

## 2025-02-17 LAB
ALBUMIN SERPL ELPH-MCNC: 3.6 G/DL — SIGNIFICANT CHANGE UP (ref 3.5–5.2)
ALP SERPL-CCNC: 66 U/L — SIGNIFICANT CHANGE UP (ref 30–115)
ALT FLD-CCNC: <5 U/L — SIGNIFICANT CHANGE UP (ref 0–41)
ANION GAP SERPL CALC-SCNC: 15 MMOL/L — HIGH (ref 7–14)
AST SERPL-CCNC: 17 U/L — SIGNIFICANT CHANGE UP (ref 0–41)
BASOPHILS # BLD AUTO: 0.02 K/UL — SIGNIFICANT CHANGE UP (ref 0–0.2)
BASOPHILS NFR BLD AUTO: 0.3 % — SIGNIFICANT CHANGE UP (ref 0–1)
BILIRUB SERPL-MCNC: 0.6 MG/DL — SIGNIFICANT CHANGE UP (ref 0.2–1.2)
BUN SERPL-MCNC: 20 MG/DL — SIGNIFICANT CHANGE UP (ref 10–20)
CALCIUM SERPL-MCNC: 9 MG/DL — SIGNIFICANT CHANGE UP (ref 8.4–10.5)
CHLORIDE SERPL-SCNC: 103 MMOL/L — SIGNIFICANT CHANGE UP (ref 98–110)
CO2 SERPL-SCNC: 24 MMOL/L — SIGNIFICANT CHANGE UP (ref 17–32)
CREAT SERPL-MCNC: 0.9 MG/DL — SIGNIFICANT CHANGE UP (ref 0.7–1.5)
EGFR: 68 ML/MIN/1.73M2 — SIGNIFICANT CHANGE UP
EOSINOPHIL # BLD AUTO: 0.08 K/UL — SIGNIFICANT CHANGE UP (ref 0–0.7)
EOSINOPHIL NFR BLD AUTO: 1 % — SIGNIFICANT CHANGE UP (ref 0–8)
GLUCOSE BLDC GLUCOMTR-MCNC: 121 MG/DL — HIGH (ref 70–99)
GLUCOSE BLDC GLUCOMTR-MCNC: 129 MG/DL — HIGH (ref 70–99)
GLUCOSE BLDC GLUCOMTR-MCNC: 165 MG/DL — HIGH (ref 70–99)
GLUCOSE SERPL-MCNC: 102 MG/DL — HIGH (ref 70–99)
HCT VFR BLD CALC: 37.5 % — SIGNIFICANT CHANGE UP (ref 37–47)
HGB BLD-MCNC: 12.4 G/DL — SIGNIFICANT CHANGE UP (ref 12–16)
IMM GRANULOCYTES NFR BLD AUTO: 0.8 % — HIGH (ref 0.1–0.3)
LYMPHOCYTES # BLD AUTO: 1.39 K/UL — SIGNIFICANT CHANGE UP (ref 1.2–3.4)
LYMPHOCYTES # BLD AUTO: 18.1 % — LOW (ref 20.5–51.1)
MAGNESIUM SERPL-MCNC: 1.7 MG/DL — LOW (ref 1.8–2.4)
MCHC RBC-ENTMCNC: 30.2 PG — SIGNIFICANT CHANGE UP (ref 27–31)
MCHC RBC-ENTMCNC: 33.1 G/DL — SIGNIFICANT CHANGE UP (ref 32–37)
MCV RBC AUTO: 91.2 FL — SIGNIFICANT CHANGE UP (ref 81–99)
MONOCYTES # BLD AUTO: 0.32 K/UL — SIGNIFICANT CHANGE UP (ref 0.1–0.6)
MONOCYTES NFR BLD AUTO: 4.2 % — SIGNIFICANT CHANGE UP (ref 1.7–9.3)
NEUTROPHILS # BLD AUTO: 5.83 K/UL — SIGNIFICANT CHANGE UP (ref 1.4–6.5)
NEUTROPHILS NFR BLD AUTO: 75.6 % — HIGH (ref 42.2–75.2)
NRBC BLD AUTO-RTO: 0 /100 WBCS — SIGNIFICANT CHANGE UP (ref 0–0)
PLATELET # BLD AUTO: 226 K/UL — SIGNIFICANT CHANGE UP (ref 130–400)
PMV BLD: 11.1 FL — HIGH (ref 7.4–10.4)
POTASSIUM SERPL-MCNC: 3.8 MMOL/L — SIGNIFICANT CHANGE UP (ref 3.5–5)
POTASSIUM SERPL-SCNC: 3.8 MMOL/L — SIGNIFICANT CHANGE UP (ref 3.5–5)
PROT SERPL-MCNC: 5.9 G/DL — LOW (ref 6–8)
RBC # BLD: 4.11 M/UL — LOW (ref 4.2–5.4)
RBC # FLD: 13.2 % — SIGNIFICANT CHANGE UP (ref 11.5–14.5)
SODIUM SERPL-SCNC: 142 MMOL/L — SIGNIFICANT CHANGE UP (ref 135–146)
WBC # BLD: 7.7 K/UL — SIGNIFICANT CHANGE UP (ref 4.8–10.8)
WBC # FLD AUTO: 7.7 K/UL — SIGNIFICANT CHANGE UP (ref 4.8–10.8)

## 2025-02-17 PROCEDURE — 99232 SBSQ HOSP IP/OBS MODERATE 35: CPT

## 2025-02-17 RX ORDER — APIXABAN 2.5 MG/1
10 TABLET, FILM COATED ORAL EVERY 12 HOURS
Refills: 0 | Status: DISCONTINUED | OUTPATIENT
Start: 2025-02-17 | End: 2025-02-17

## 2025-02-17 RX ORDER — MAGNESIUM SULFATE 500 MG/ML
2 SYRINGE (ML) INJECTION ONCE
Refills: 0 | Status: COMPLETED | OUTPATIENT
Start: 2025-02-17 | End: 2025-02-17

## 2025-02-17 RX ORDER — LACTULOSE 10 G/15ML
20 SOLUTION ORAL
Refills: 0 | Status: COMPLETED | OUTPATIENT
Start: 2025-02-17 | End: 2025-02-17

## 2025-02-17 RX ORDER — APIXABAN 2.5 MG/1
10 TABLET, FILM COATED ORAL EVERY 12 HOURS
Refills: 0 | Status: DISCONTINUED | OUTPATIENT
Start: 2025-02-17 | End: 2025-02-19

## 2025-02-17 RX ORDER — APIXABAN 2.5 MG/1
1 TABLET, FILM COATED ORAL
Qty: 72 | Refills: 0
Start: 2025-02-17 | End: 2025-03-18

## 2025-02-17 RX ORDER — APIXABAN 2.5 MG/1
1 TABLET, FILM COATED ORAL
Qty: 0 | Refills: 0 | DISCHARGE
Start: 2025-02-17

## 2025-02-17 RX ADMIN — APIXABAN 10 MILLIGRAM(S): 2.5 TABLET, FILM COATED ORAL at 17:37

## 2025-02-17 RX ADMIN — Medication 25 GRAM(S): at 12:09

## 2025-02-17 RX ADMIN — POLYETHYLENE GLYCOL 3350 17 GRAM(S): 17 POWDER, FOR SOLUTION ORAL at 05:52

## 2025-02-17 RX ADMIN — ENOXAPARIN SODIUM 70 MILLIGRAM(S): 100 INJECTION SUBCUTANEOUS at 05:52

## 2025-02-17 RX ADMIN — LACTULOSE 20 GRAM(S): 10 SOLUTION ORAL at 17:38

## 2025-02-17 RX ADMIN — INSULIN LISPRO 2: 100 INJECTION, SOLUTION INTRAVENOUS; SUBCUTANEOUS at 12:11

## 2025-02-17 RX ADMIN — LACTULOSE 20 GRAM(S): 10 SOLUTION ORAL at 14:31

## 2025-02-17 RX ADMIN — CLONAZEPAM 0.25 MILLIGRAM(S): 0.5 TABLET ORAL at 17:45

## 2025-02-17 RX ADMIN — CLONAZEPAM 0.25 MILLIGRAM(S): 0.5 TABLET ORAL at 05:52

## 2025-02-17 RX ADMIN — METOPROLOL SUCCINATE 50 MILLIGRAM(S): 50 TABLET, EXTENDED RELEASE ORAL at 17:39

## 2025-02-17 RX ADMIN — POLYETHYLENE GLYCOL 3350 17 GRAM(S): 17 POWDER, FOR SOLUTION ORAL at 17:39

## 2025-02-17 RX ADMIN — METOPROLOL SUCCINATE 50 MILLIGRAM(S): 50 TABLET, EXTENDED RELEASE ORAL at 05:52

## 2025-02-17 NOTE — DISCHARGE NOTE PROVIDER - HOSPITAL COURSE
73 y/o female PMH Parkinson's, DM, HTN, HLD presents for evaluation of Altered Mental Status. Per family at bedside, the pt had an incident a few days ago where she was visibly blue and unresponsive that lasted a few minutes 4 days ago. This resolved spontaneously. However, patient also started complaining of dysuria, increased pressure in the lower abdomen, dark urine and some right flank pain on that day. She has also been noted to be acting more confused than baseline and not making sense when she speaks. She endorses chills but no fevers at home. She denies chest pain or any shortness of breath. No recent illnesses or sick contacts. Upon my assessment, patient is pleasant but confused, says she's feeling well, but not giving meaningful responses to other questions.  In the ED, /72, HR 94, RR 18, satting 95% on RA, afebrile  Coags wnl, Trop 14, , UA pos but contaminated, lactate 1.5, lipase 40, Na 131, K 3.8, Cr 0.8, LFTs wnl, WBC 4.4, Hgb 12.4,   CXR wnl. CT head- No evidence of acute transcortical infarct, acute intracranial hemorrhage, or mass effect.  CTAP- 2.  No CT evidence of an acute abdominopelvic pathology.  CTPE- Acute pulmonary emboli with evidence of right heart strain, correlating with 2/13/2025 CT: Distal left main pulmonary artery extending into the anterior segmental branch. Left lower lobe segmental branch. Right middle lobe and right lower lobe segmental branches.  Given lovenox, rocephin, IVF and admitted to medicine for acute PEs with RHS    Hospital Course:  Pt was treated with therapeutic lovenox. Then eliquis with loading. Pt was also treated with rocephin for positive UA. On today's exam, pt has no acute complaints. Stable for dc.      #Bilateral PE  - CT angio: Distal left main pulmonary artery extending into the anterior segmental branch. Left lower lobe segmental branch. Right middle lobe and right lower lobe segmental branches.  - TTE: EF 64%, G1DD, trace MR. neg for RHS  - LE duplex: Left posterior tibial vein DVT present  - per IR: no interventions  - switch therapeutic lovenox to eliquis w/ loading ( 10mg bid for 3 more days, then 5mg bid)      #+UA  - UCx neg  - s/p rocephin * 3 dose      #Parkinson  -C/W crexont, clonazepam, ambien for eszipiclone        #HTN  #HLD  -Lopressor 50 BID for nebivolol 5 BID      #DM  - -120  - ISS   74F PMH Parkinson's, DM2, HTN, HLD presents for evaluation of Altered Mental Status. Found to have acute pulmonary embolism.    #Acute Bilateral PEs with RHS, LE DVT   -No intervention per IR   -Eliquis 10mg po BID x7 days then 5mg po bid for at least 6 months and to f/u with Hematologist outpt   -Pt will need f/u with PMD to screen for malignancy and hypercoagulable state     #Encephalopathy Rule out Seizures vs Delirium  - CTH unremarkable  - UCx negative   - off abx   - VEEG neg, s/p Neuro eval,   - pt back to baseline     #Parkinson  -C/w crexont, clonazepam, ambien   -Son states CREXONT is a new medication and wondering if it can cause confusion and AMS    #constipation: add lactulose and dulcolax     #DM2  #HTN  #HLD  -ISS  - norvasc 2.5mg qd for nebivolol 5 BID    DVT PPX- Eliquis   Gi Proph- Protonix     #Progress Note Handoff  Pending (specify):  DC to STR placement    pt anticipated for discharge this am.    74F PMH Parkinson's, DM2, HTN, HLD presents for evaluation of Altered Mental Status. Found to have acute pulmonary embolism.    #Acute Bilateral PEs with RHS, LE DVT   -No intervention per IR   -Eliquis 10mg po BID x7 days then 5mg po bid for at least 6 months and to f/u with Hematologist outpt   -Pt will need f/u with PMD to screen for malignancy and hypercoagulable state     #Encephalopathy Rule out Seizures vs Delirium  - CTH unremarkable  - UCx negative   - off abx   - VEEG neg, s/p Neuro eval,   - pt back to baseline   #Parkinson  -C/w crexont, clonazepam, ambien   -Son states CREXONT is a new medication and wondering if it can cause confusion and AMS    #constipation: add lactulose and dulcolax, passed BM  #acute urinary retention, failed TOV     #DM2  #HTN  #HLD  -ISS  - norvasc 2.5mg qd for nebivolol 5 BID        DVT PPX- Eliquis   Gi Proph- Protonix

## 2025-02-17 NOTE — PHYSICAL THERAPY INITIAL EVALUATION ADULT - ADDITIONAL COMMENTS
Patient is a poor historian. As per chart. Patient lives alone in an apartment with elevator access. no steps to enter. Patient has HHA for 7hr a day every day. Patient reported using rollator for ambulation with assistance prior to admission. Patient owns cane, shower chair as per chart.

## 2025-02-17 NOTE — DISCHARGE NOTE PROVIDER - NSDCCPCAREPLAN_GEN_ALL_CORE_FT
PRINCIPAL DISCHARGE DIAGNOSIS  Diagnosis: Pulmonary embolism  Assessment and Plan of Treatment: You were admitted because of blood clots in your lungs. You were treated with blood thinners. You were also treated with antibiotics for suspected urine infection.  After discharge, please take medications as prescribed. Please follow up with primary care doctor.      SECONDARY DISCHARGE DIAGNOSES  Diagnosis: Acute UTI  Assessment and Plan of Treatment:     Diagnosis: Weakness  Assessment and Plan of Treatment:      PRINCIPAL DISCHARGE DIAGNOSIS  Diagnosis: Pulmonary embolism  Assessment and Plan of Treatment: You were admitted because of blood clots in your lungs. You were treated with blood thinners. You were also treated with antibiotics for suspected urine infection.   We also consulted neurology in the setting of altered mental status, we obtained an EEG, which is a scan of your head and ruled out seizures, we further followed it up with a video EEG. You have improved to youtr baseline mental status and are stable for discharge this am.   After discharge, please take medications as prescribed. Please follow up with primary care doctor.      SECONDARY DISCHARGE DIAGNOSES  Diagnosis: Acute UTI  Assessment and Plan of Treatment:     Diagnosis: Weakness  Assessment and Plan of Treatment:      PRINCIPAL DISCHARGE DIAGNOSIS  Diagnosis: Pulmonary embolism  Assessment and Plan of Treatment: You were admitted because of blood clots in your lungs and lower extremities, You were treated with blood thinners. You need to contnue the bllood thinner for at least 6 months then get evaluation by the hematology doctor  also treated with antibiotics for suspected urine infection.   also due to urinary retention you ll be discharged with gonzales  trial of voiding to be done in 2 weeks with Outpatient Urology follow up   We also consulted neurology in the setting of altered mental status, we obtained an EEG, which is a scan of your head and ruled out seizures, we further followed it up with a video EEG. You have improved to youtr baseline mental status and are stable for discharge.  After discharge, please take medications as prescribed. Please follow up with primary care doctor.      SECONDARY DISCHARGE DIAGNOSES  Diagnosis: Acute UTI  Assessment and Plan of Treatment:     Diagnosis: Weakness  Assessment and Plan of Treatment:

## 2025-02-17 NOTE — PHYSICAL THERAPY INITIAL EVALUATION ADULT - PERTINENT HX OF CURRENT PROBLEM, REHAB EVAL
Patient is a 73 y/o female PMH Parkinson's, DM, HTN, HLD presented for evaluation of Altered Mental Status 2/2 PE and UTI. ANY BED

## 2025-02-17 NOTE — DISCHARGE NOTE PROVIDER - NSDCFUADDAPPT_GEN_ALL_CORE_FT
APPTS ARE READY TO BE MADE: [ x] YES    Best Family or Patient Contact (if needed):    Additional Information about above appointments (if needed):    1: Neurology Re: AMS workup, follow up after EEG  2: Hematology re: end date for the AC  3:     Other comments or requests:

## 2025-02-17 NOTE — PHYSICAL THERAPY INITIAL EVALUATION ADULT - GENERAL OBSERVATIONS, REHAB EVAL
08:30-09:03. Patient was seen for PT IE. Patient was rec'd in semi reclined in bed, eating breakfast, +IVL , +Tele, + primafit, NAD, alert but confused, agreeable to participate in PT.

## 2025-02-17 NOTE — CHART NOTE - NSCHARTNOTEFT_GEN_A_CORE
Consult received for "MST Score = />2." Upon chart review, patient with stable weight, does not currently meet criteria for Protein Calorie Malnutrition risk per MST. RD remains available for assessment per protocol or as needed.    Jean Cladue Emerson [phone] or via Teams

## 2025-02-17 NOTE — DISCHARGE NOTE PROVIDER - PROVIDER TOKENS
PROVIDER:[TOKEN:[[id number]],FOLLOWUP:[1 week]] PROVIDER:[TOKEN:[[id number]],FOLLOWUP:[1 week]],PROVIDER:[TOKEN:[[id number]],FOLLOWUP:[2 weeks]]

## 2025-02-17 NOTE — DISCHARGE NOTE PROVIDER - ATTENDING DISCHARGE PHYSICAL EXAMINATION:
General: old lady not in distress   HEENT: NC/AT; PERRL, clear conjunctiva  Neck: supple  Cardiovascular: +S1/S2; RRR  Respiratory: CTA b/l; no W/R/R  Gastrointestinal: soft, NT/ND; +BSx4  Extremities: WWP; 2+ peripheral pulses; no edema   Neurological: AAOx3; no focal deficits

## 2025-02-17 NOTE — DISCHARGE NOTE PROVIDER - NSDCMRMEDTOKEN_GEN_ALL_CORE_FT
apixaban 5 mg oral tablet: 1 tab(s) orally every 12 hours take 2 tabs, twice a day for 3 days (starting Feb 18th). then take 1 tab, twice a day.  apixaban 5 mg oral tablet: 1 tab(s) orally every 12 hours  Bystolic 5 mg oral tablet: 1 tablet with sensor orally 2 times a day  clonazePAM 0.25 mg oral tablet: 1 tab(s) orally 2 times a day  Crexont 70 mg-280 mg oral capsule, extended release: 2 cap(s) orally 4 times a day  Lunesta 3 mg oral tablet: 1 tab(s) orally once a day (at bedtime)  metFORMIN 500 mg oral tablet: 1 tablet with sensor orally once a day   amLODIPine 2.5 mg oral tablet: 1 tab(s) orally once a day  apixaban 5 mg oral tablet: 2 tab(s) orally every 12 hours take until 24th February AM Last Rx: Feb 24th Morning dose  apixaban 5 mg oral tablet: 1 tab(s) orally every 12 hours Start this medication on 24th Feb PM, and continue thereafter  bisacodyl 5 mg oral delayed release tablet: 1 tab(s) orally once a day (at bedtime)  clonazePAM 0.25 mg oral tablet: 1 tab(s) orally 2 times a day  Crexont 70 mg-280 mg oral capsule, extended release: 2 cap(s) orally 4 times a day  Lunesta 3 mg oral tablet: 1 tab(s) orally once a day (at bedtime)  senna leaf extract oral tablet: 2 tab(s) orally once a day (at bedtime)

## 2025-02-17 NOTE — PROGRESS NOTE ADULT - ASSESSMENT
75 y/o female PMH Parkinson's, DM, HTN, HLD presents for evaluation of Altered Mental Status 2/2 PE and UTI      #Bilateral PE  -IR and pulm consults  -Lovenox therapeutic  -LE Duplex  -can repeat Trop  -Obtain TTE      #Cystitis?  -Repeat UA and urine culture  -Rocephin for now    #Parkinson  -C/W crexont, clonazepam, ambien for eszipiclone    #DM  #HTN  #HLD  -ISS for metformin, A1C  -Lopressor 50 BID for nebivolol 5 BID    #DVT PPX- Lovenox  #Diet- DASH, Diabetic  #Dispo- Tele   73 yo F with PMH of Parkinson's, DM, HTN, HLD presents for evaluation of Altered Mental Status 2/2 PE       Plan    #Bilateral PE  - CT angio: Distal left main pulmonary artery extending into the anterior segmental branch. Left lower lobe segmental branch. Right middle lobe and right lower lobe segmental branches.  - TTE: EF 64%, G1DD, trace MR. neg for RHS  - LE duplex: Left posterior tibial vein DVT present  - per IR: no interventions  - switch therapeutic lovenox to eliquis w/ loading ( 10mg bid for 3 more days, then 5mg bid)      #+UA  - UCx neg  - s/p rocephin * 3 dose      #Parkinson  -C/W crexont, clonazepam, ambien for eszipiclone        #HTN  #HLD  -Lopressor 50 BID for nebivolol 5 BID      #DM  - -120  - ISS      #MISC  - DVT PPX: eliquis  - Diet- DASH, Diabetic  - Dispo: STR

## 2025-02-17 NOTE — DISCHARGE NOTE PROVIDER - CARE PROVIDER_API CALL
Sandra Salas  Internal Medicine  [address] [place] [id number]  Phone: ([phone]  Fax: ()-  Follow Up Time: 1 week   Sandra Salas  Internal Medicine  [address]  [place] [address]  Phone: ()-  Fax: ()-  Follow Up Time: 1 week    Sandra Alaniz  Urology  [address] [place] [id number]  Phone: [phone]  Fax: [phone]  Follow Up Time: 2 weeks

## 2025-02-17 NOTE — PROGRESS NOTE ADULT - ATTENDING COMMENTS
73 y/o female PMH Parkinson's, DM, HTN, HLD presents for evaluation of Altered Mental Status. Found to have acute pulmonary embolism.        #Acute Bilateral PEs with RHS  CT Chest PE protocol showing acute PE with evidence of right heart strain  -No intervention per IR   -Switch to Eliquis     #Delirium, hospital acquired   - CTH unremarkable  - UCx negative   - off abx   - rEEG done, pending read     #Parkinson  -C/w crexont, clonazepam, ambien PRN for eszipiclone    #DM  #HTN  #HLD  -ISS for metformin, A1C  -Lopressor 50 BID for nebivolol 5 BID    DVT PPX- Eliquis     #Progress Note Handoff  Pending (specify):  STR placement, EEG results   Plan of care d/w son at bedside   Dispo: STR in [place]

## 2025-02-17 NOTE — PROGRESS NOTE ADULT - SUBJECTIVE AND OBJECTIVE BOX
SUBJECTIVE/OVERNIGHT EVENTS  Today is hospital day 4d. This morning patient was seen and examined at bedside, resting comfortably in bed. No acute or major events overnight.    HOSPITAL COURSE      CODE STATUS:    FAMILY COMMUNICATION  Contact date:  Name of person contacted:  Relationship to patient:  Communication details:    MEDICATIONS  STANDING MEDICATIONS  clonazePAM Oral Disintegrating Tablet 0.25 milliGRAM(s) Oral two times a day  CREXONT 70mg/280mg Extended release caps 2 Capsule(s) 2 Capsule(s) Oral four times a day  dextrose 5%. 1000 milliLiter(s) IV Continuous <Continuous>  dextrose 5%. 1000 milliLiter(s) IV Continuous <Continuous>  dextrose 50% Injectable 25 Gram(s) IV Push once  dextrose 50% Injectable 12.5 Gram(s) IV Push once  dextrose 50% Injectable 25 Gram(s) IV Push once  enoxaparin Injectable 70 milliGRAM(s) SubCutaneous every 12 hours  glucagon  Injectable 1 milliGRAM(s) IntraMuscular once  insulin lispro (ADMELOG) corrective regimen sliding scale   SubCutaneous three times a day before meals  insulin lispro (ADMELOG) corrective regimen sliding scale   SubCutaneous at bedtime  metoprolol tartrate 50 milliGRAM(s) Oral two times a day  polyethylene glycol 3350 17 Gram(s) Oral two times a day  senna 2 Tablet(s) Oral at bedtime    PRN MEDICATIONS  acetaminophen     Tablet .. 650 milliGRAM(s) Oral every 6 hours PRN  aluminum hydroxide/magnesium hydroxide/simethicone Suspension 30 milliLiter(s) Oral every 4 hours PRN  dextrose Oral Gel 15 Gram(s) Oral once PRN  melatonin 3 milliGRAM(s) Oral at bedtime PRN  ondansetron Injectable 4 milliGRAM(s) IV Push every 8 hours PRN  zolpidem 5 milliGRAM(s) Oral at bedtime PRN    VITALS  T(F): 98.1 (02-17-25 @ 04:21), Max: 98.1 (02-17-25 @ 04:21)  HR: 97 (02-17-25 @ 04:21) (80 - 101)  BP: 142/79 (02-17-25 @ 04:21) (118/75 - 154/78)  RR: 18 (02-17-25 @ 04:21) (18 - 18)  SpO2: --  POCT Blood Glucose.: 119 mg/dL (02-16-25 @ 21:21)  POCT Blood Glucose.: 109 mg/dL (02-16-25 @ 16:29)  POCT Blood Glucose.: 98 mg/dL (02-16-25 @ 11:07)  POCT Blood Glucose.: 107 mg/dL (02-16-25 @ 07:38)    PHYSICAL EXAM  GENERAL  ( x ) NAD, lying in bed comfortably     (  ) obtunded     (  ) lethargic     (  ) somnolent    HEAD  ( x ) Atraumatic     (  ) hematoma     (  ) laceration (specify location:       )     NECK  ( x ) Supple     (  ) neck stiffness     (  ) nuchal rigidity     (  )  no JVD     (  ) JVD present ( -- cm)    HEART  Rate -->  ( x ) normal rate    (  ) bradycardic    (  ) tachycardic  Rhythm -->  ( x ) regular    (  ) regularly irregular    (  ) irregularly irregular  Murmurs -->  ( x ) normal s1/s2    (  ) systolic murmur    (  ) diastolic murmur    (  ) continuous murmur     (  ) S3 present    (  ) S4 present    LUNGS  ( x )Unlabored respirations     (  ) tachypnea  (  ) B/L air entry     (  ) decreased breath sounds in:  (location     )    (  ) no adventitious sound     (  ) crackles     (  ) wheezing      (  ) rhonchi      (specify location:       )  (  ) chest wall tenderness (specify location:       )    ABDOMEN  (x  ) Soft     (  ) tense   |   (  ) nondistended     (  ) distended   |   (  ) +BS     (  ) hypoactive bowel sounds     (  ) hyperactive bowel sounds  (  ) nontender     (  ) RUQ tenderness     (  ) RLQ tenderness     (  ) LLQ tenderness     (  ) epigastric tenderness     (  ) diffuse tenderness  (  ) Splenomegaly      (  ) Hepatomegaly      (  ) Jaundice     (  ) ecchymosis     EXTREMITIES  (x  ) Normal     (  ) Rash     (  ) ecchymosis     (  ) varicose veins      (  ) pitting edema     (  ) non-pitting edema   (  ) ulceration     (  ) gangrene:     (location:     )    NERVOUS SYSTEM  ( x ) A&Ox3     (  ) confused     (  ) lethargic  CN II-XII:     (  ) Intact     (  ) focal deficits  (Specify:     )   Upper extremities:     (  ) strength X/5     (  ) focal deficit (specify:    )  Lower extremities:     (  ) strength  X/5    (  ) focal deficit (specify:    )    SKIN  (x  ) No rashes or lesions     (  ) maculopapular rash     (  ) pustules     (  ) vesicles     (  ) ulcer     (  ) ecchymosis     (specify location:     )    (  ) Indwelling Jhaveri Catheter   Date insterted:    Reason (  ) Critical illness     (  ) urinary retention    (  ) Accurate Ins/Outs Monitoring     (  ) CMO patient    (  ) Central Line  Date inserted:  Location: (  ) Right IJ   (  ) Left IJ   (  ) Right Fem   (  ) Left Fem    (  ) SPC  (  ) pigtail  (  ) PEG tube  (  ) colostomy  (  ) jejunostomy  (  ) U-Dall    LABS             12.2   7.00  )-----------( 243      ( 02-16-25 @ 07:19 )             35.8     138  |  101  |  18  -------------------------<  106   02-16-25 @ 07:19  3.6  |  22  |  0.8    Ca      8.5     02-16-25 @ 07:19  Mg     1.7     02-16-25 @ 07:19    TPro  5.2  /  Alb  3.4  /  TBili  0.5  /  DBili  x   /  AST  12  /  ALT  <5  /  AlkPhos  63  /  GGT  x     02-16-25 @ 07:19      Troponin T, High Sensitivity Result: 26 ng/L (02-15-25 @ 07:55)  Troponin T, High Sensitivity Result: 24 ng/L (02-14-25 @ 16:56)    Urinalysis Basic - ( 16 Feb 2025 07:19 )    Color: x / Appearance: x / SG: x / pH: x  Gluc: 106 mg/dL / Ketone: x  / Bili: x / Urobili: x   Blood: x / Protein: x / Nitrite: x   Leuk Esterase: x / RBC: x / WBC x   Sq Epi: x / Non Sq Epi: x / Bacteria: x          IMAGING

## 2025-02-17 NOTE — DISCHARGE NOTE PROVIDER - CARE PROVIDERS DIRECT ADDRESSES
,DirectAddress_Unknown ,DirectAddress_Unknown,[email or web address]

## 2025-02-18 LAB
ALBUMIN SERPL ELPH-MCNC: 3.4 G/DL — LOW (ref 3.5–5.2)
ALP SERPL-CCNC: 64 U/L — SIGNIFICANT CHANGE UP (ref 30–115)
ALT FLD-CCNC: <5 U/L — SIGNIFICANT CHANGE UP (ref 0–41)
ANION GAP SERPL CALC-SCNC: 12 MMOL/L — SIGNIFICANT CHANGE UP (ref 7–14)
AST SERPL-CCNC: 18 U/L — SIGNIFICANT CHANGE UP (ref 0–41)
BASOPHILS # BLD AUTO: 0.02 K/UL — SIGNIFICANT CHANGE UP (ref 0–0.2)
BASOPHILS NFR BLD AUTO: 0.3 % — SIGNIFICANT CHANGE UP (ref 0–1)
BILIRUB SERPL-MCNC: 0.7 MG/DL — SIGNIFICANT CHANGE UP (ref 0.2–1.2)
BUN SERPL-MCNC: 16 MG/DL — SIGNIFICANT CHANGE UP (ref 10–20)
CALCIUM SERPL-MCNC: 8.7 MG/DL — SIGNIFICANT CHANGE UP (ref 8.4–10.4)
CHLORIDE SERPL-SCNC: 103 MMOL/L — SIGNIFICANT CHANGE UP (ref 98–110)
CO2 SERPL-SCNC: 27 MMOL/L — SIGNIFICANT CHANGE UP (ref 17–32)
CREAT SERPL-MCNC: 0.7 MG/DL — SIGNIFICANT CHANGE UP (ref 0.7–1.5)
EGFR: 91 ML/MIN/1.73M2 — SIGNIFICANT CHANGE UP
EOSINOPHIL # BLD AUTO: 0.1 K/UL — SIGNIFICANT CHANGE UP (ref 0–0.7)
EOSINOPHIL NFR BLD AUTO: 1.3 % — SIGNIFICANT CHANGE UP (ref 0–8)
GLUCOSE BLDC GLUCOMTR-MCNC: 118 MG/DL — HIGH (ref 70–99)
GLUCOSE BLDC GLUCOMTR-MCNC: 119 MG/DL — HIGH (ref 70–99)
GLUCOSE BLDC GLUCOMTR-MCNC: 121 MG/DL — HIGH (ref 70–99)
GLUCOSE BLDC GLUCOMTR-MCNC: 130 MG/DL — HIGH (ref 70–99)
GLUCOSE SERPL-MCNC: 112 MG/DL — HIGH (ref 70–99)
HCT VFR BLD CALC: 36.8 % — LOW (ref 37–47)
HGB BLD-MCNC: 12.2 G/DL — SIGNIFICANT CHANGE UP (ref 12–16)
IMM GRANULOCYTES NFR BLD AUTO: 0.5 % — HIGH (ref 0.1–0.3)
LYMPHOCYTES # BLD AUTO: 1.17 K/UL — LOW (ref 1.2–3.4)
LYMPHOCYTES # BLD AUTO: 14.7 % — LOW (ref 20.5–51.1)
MAGNESIUM SERPL-MCNC: 1.9 MG/DL — SIGNIFICANT CHANGE UP (ref 1.8–2.4)
MCHC RBC-ENTMCNC: 30.8 PG — SIGNIFICANT CHANGE UP (ref 27–31)
MCHC RBC-ENTMCNC: 33.2 G/DL — SIGNIFICANT CHANGE UP (ref 32–37)
MCV RBC AUTO: 92.9 FL — SIGNIFICANT CHANGE UP (ref 81–99)
MONOCYTES # BLD AUTO: 0.31 K/UL — SIGNIFICANT CHANGE UP (ref 0.1–0.6)
MONOCYTES NFR BLD AUTO: 3.9 % — SIGNIFICANT CHANGE UP (ref 1.7–9.3)
NEUTROPHILS # BLD AUTO: 6.33 K/UL — SIGNIFICANT CHANGE UP (ref 1.4–6.5)
NEUTROPHILS NFR BLD AUTO: 79.3 % — HIGH (ref 42.2–75.2)
NRBC BLD AUTO-RTO: 0 /100 WBCS — SIGNIFICANT CHANGE UP (ref 0–0)
PLATELET # BLD AUTO: 193 K/UL — SIGNIFICANT CHANGE UP (ref 130–400)
PMV BLD: 10.7 FL — HIGH (ref 7.4–10.4)
POTASSIUM SERPL-MCNC: 3.6 MMOL/L — SIGNIFICANT CHANGE UP (ref 3.5–5)
POTASSIUM SERPL-SCNC: 3.6 MMOL/L — SIGNIFICANT CHANGE UP (ref 3.5–5)
PROT SERPL-MCNC: 5.3 G/DL — LOW (ref 6–8)
RBC # BLD: 3.96 M/UL — LOW (ref 4.2–5.4)
RBC # FLD: 13.2 % — SIGNIFICANT CHANGE UP (ref 11.5–14.5)
SODIUM SERPL-SCNC: 142 MMOL/L — SIGNIFICANT CHANGE UP (ref 135–146)
WBC # BLD: 7.97 K/UL — SIGNIFICANT CHANGE UP (ref 4.8–10.8)
WBC # FLD AUTO: 7.97 K/UL — SIGNIFICANT CHANGE UP (ref 4.8–10.8)

## 2025-02-18 PROCEDURE — 99233 SBSQ HOSP IP/OBS HIGH 50: CPT

## 2025-02-18 RX ORDER — ENALAPRIL MALEATE 20 MG
5 TABLET ORAL DAILY
Refills: 0 | Status: DISCONTINUED | OUTPATIENT
Start: 2025-02-18 | End: 2025-02-19

## 2025-02-18 RX ADMIN — APIXABAN 10 MILLIGRAM(S): 2.5 TABLET, FILM COATED ORAL at 05:41

## 2025-02-18 RX ADMIN — Medication 2 TABLET(S): at 05:55

## 2025-02-18 RX ADMIN — POLYETHYLENE GLYCOL 3350 17 GRAM(S): 17 POWDER, FOR SOLUTION ORAL at 05:47

## 2025-02-18 RX ADMIN — Medication 2 TABLET(S): at 21:36

## 2025-02-18 RX ADMIN — CLONAZEPAM 0.25 MILLIGRAM(S): 0.5 TABLET ORAL at 05:41

## 2025-02-18 RX ADMIN — POLYETHYLENE GLYCOL 3350 17 GRAM(S): 17 POWDER, FOR SOLUTION ORAL at 17:40

## 2025-02-18 RX ADMIN — METOPROLOL SUCCINATE 50 MILLIGRAM(S): 50 TABLET, EXTENDED RELEASE ORAL at 05:40

## 2025-02-18 RX ADMIN — APIXABAN 10 MILLIGRAM(S): 2.5 TABLET, FILM COATED ORAL at 17:40

## 2025-02-18 RX ADMIN — METOPROLOL SUCCINATE 50 MILLIGRAM(S): 50 TABLET, EXTENDED RELEASE ORAL at 17:40

## 2025-02-18 RX ADMIN — CLONAZEPAM 0.25 MILLIGRAM(S): 0.5 TABLET ORAL at 17:40

## 2025-02-18 NOTE — PROGRESS NOTE ADULT - ASSESSMENT
73 y/o female PMH Parkinson's, DM, HTN, HLD presents for evaluation of Altered Mental Status. Found to have acute pulmonary embolism.      #Acute Bilateral PEs with RHS  CT Chest PE protocol showing acute PE with evidence of right heart strain  -No intervention per IR, hencec Switch to Eliquis     #Delirium, hospital acquired   - CTH unremarkable  - UCx negative   - off abx   - rEEG done, likely 2/2 to toxic state, follow up official neurology recs    #Parkinson  -C/w crexont, clonazepam, ambien PRN for eszipiclone    #DM  #HTN  #HLD  -ISS for metformin, A1C  - Lopressor 50 BID for nebivolol 5 BID  - Started enalapril 5mg OD this am    DVT PPX- Eliquis     #Progress Note Handoff  Pending (specify):  STR placement  Plan of care d/w son at bedside   Dispo: STR in [place]    Anticipate for discharge jessica

## 2025-02-18 NOTE — CONSULT NOTE ADULT - SUBJECTIVE AND OBJECTIVE BOX
Neurology - Consult Note    HPI: Patient HIPOLITO ESCAMILLA is a 74y (1950) woman with a PMHx significant for Parkinson's, DM, HTN, HLD admitted to hospital for evaluation of AMS, found to have acute pulmonary emboli with evidence of right heart strain. Neurology consulted for continued AMS.     Per family at bedside, the patient had an incident a few days prior to admission where she was visibly blue and unresponsive for a few minutes- patient did not want to go to the hospital afterwards. Family brought patient to hospital for continued confusion. They state that 12 days ago, the patient had a medication change for her Parkinson's and depression. She was started on Crexont (previously on carbidopa-levodopa) and Zoloft. Patient has had Parkinson's for past ~20 years. She is unable to perform any of her ADLs at baseline and has a home attendant for 7 hours a day. Patient walks with a walker at baseline. She is alert and oriented x3 at baseline.    CT head : No evidence of acute transcortical infarct, acute intracranial hemorrhage, or mass /effect.  EEG : There were indicators of  bifrontal  cortical dysfunction, irritability, and epileptic potential. Findings consistent with diffuse electrocerebral dysfunction secondary to metabolic etiology and epileptic potential.       Allergies:  No Known Allergies      PMHx/PSHx/Family Hx: As above, otherwise see below   Parkinsons    Hypertension    Hyperlipidemia    Diabetes        Social Hx:  No current use of tobacco, alcohol, or illicit drugs  Lives alone in [place], has home attendant 7 hours a day.    Medications:  MEDICATIONS  (STANDING):  apixaban 10 milliGRAM(s) Oral every 12 hours  clonazePAM Oral Disintegrating Tablet 0.25 milliGRAM(s) Oral two times a day  CREXONT 70mg/280mg Extended release caps 2 Capsule(s) 2 Capsule(s) Oral four times a day  dextrose 5%. 1000 milliLiter(s) (50 mL/Hr) IV Continuous <Continuous>  dextrose 5%. 1000 milliLiter(s) (100 mL/Hr) IV Continuous <Continuous>  dextrose 50% Injectable 25 Gram(s) IV Push once  dextrose 50% Injectable 12.5 Gram(s) IV Push once  dextrose 50% Injectable 25 Gram(s) IV Push once  enalapril 5 milliGRAM(s) Oral daily  glucagon  Injectable 1 milliGRAM(s) IntraMuscular once  insulin lispro (ADMELOG) corrective regimen sliding scale   SubCutaneous three times a day before meals  insulin lispro (ADMELOG) corrective regimen sliding scale   SubCutaneous at bedtime  metoprolol tartrate 50 milliGRAM(s) Oral two times a day  polyethylene glycol 3350 17 Gram(s) Oral two times a day  senna 2 Tablet(s) Oral at bedtime    MEDICATIONS  (PRN):  acetaminophen     Tablet .. 650 milliGRAM(s) Oral every 6 hours PRN Temp greater or equal to 38C (100.4F), Mild Pain (1 - 3)  aluminum hydroxide/magnesium hydroxide/simethicone Suspension 30 milliLiter(s) Oral every 4 hours PRN Dyspepsia  dextrose Oral Gel 15 Gram(s) Oral once PRN Blood Glucose LESS THAN 70 milliGRAM(s)/deciliter  melatonin 3 milliGRAM(s) Oral at bedtime PRN Insomnia  ondansetron Injectable 4 milliGRAM(s) IV Push every 8 hours PRN Nausea and/or Vomiting  zolpidem 5 milliGRAM(s) Oral at bedtime PRN Insomnia      Vitals:  T(C): 36.8 (25 @ 05:33), Max: 37 (25 @ 14:13)  HR: 69 (25 @ 05:33) (69 - 89)  BP: 157/80 (25 @ 05:33) (152/78 - 161/82)  RR: 19 (25 @ 05:33) (18 - 19)  SpO2: 96% (25 @ 05:33) (95% - 97%)    Physical Examination: INCOMPLETE  General - NAD, pleasant, cooperative   Neurological Exam:   Mental status: Awake, alert, oriented to name, , and year. Does not know where she is or why she is in the hospital.  No dysarthria, no aphasia.    Cranial nerves: Pupils equally round and reactive to light, visual fields full, no nystagmus, extraocular muscles intact, V1 through V3 intact bilaterally and symmetric, face symmetric, hearing intact to finger rub, palate elevation symmetric, tongue was midline.  Motor: Unable to raise b/l LE.  strength 2/5. Cogwheel rigidity noted in b/l UE.     Sensation: Intact to light touch.  Gait: deferred      Labs:                        12.2   7.97  )-----------( 193      ( 2025 07:00 )             36.8     02-18    142  |  103  |  16  ----------------------------<  112[H]  3.6   |  27  |  0.7    Ca    8.7      2025 07:00  Mg     1.9         TPro  5.3[L]  /  Alb  3.4[L]  /  TBili  0.7  /  DBili  x   /  AST  18  /  ALT  <5  /  AlkPhos  64      CAPILLARY BLOOD GLUCOSE      POCT Blood Glucose.: 119 mg/dL (2025 11:34)    LIVER FUNCTIONS - ( 2025 07:00 )  Alb: 3.4 g/dL / Pro: 5.3 g/dL / ALK PHOS: 64 U/L / ALT: <5 U/L / AST: 18 U/L / GGT: x           Radiology:  CT Head No Cont:  (2025 19:00)  < from: CT Head No Cont (25 @ 19:00) >  FINDINGS:  There is no CT evidence of acute transcortical infarct. Age-related   involutional changes and chronic microvascular ischemic changes.    There is no hydrocephalus, mass effect, or acute intracranial hemorrhage.   No extra-axial collection. Basal cisterns are patent.    The visualized paranasal sinuses and mastoid air cells areclear.    The calvarium is intact.    IMPRESSION:  No evidence of acute transcortical infarct, acute intracranial   hemorrhage, or mass effect.    < end of copied text >    EEG :  Findings:  Background:  continuous, with predominantly theta frequencies.  Generalized Slowing:  Frequent  polymorphic delta and GRDA.   Symmetry/Focality: Frequent (10-49%)  periods of bifrontal  FIRDA    Voltage:  Normal (20+ uV)  Organization:  Appropriate anterior-posterior gradient  Posterior Dominant Rhythm:  8-8.5 Hz symmetric, intermittently maintained.   Sleep:  Absent.  Variability:   Yes		Reactivity:  Yes    Spontaneous Activity:  Frequent ( >1/min < 1/10sec) bifrontal LPDs and GPDs.   Events:  1)	No electrographic seizures or significant clinical events occurred during this study.  Provocations:  •	Hyperventilation: was not performed.  •	Photic stimulation: was not performed.  FINAL Impression:  Abnormalawake and sleep routine EEG  1)	 There was diffuse slowing of electrographic activity  2)	There was bifrontal LPDs and FIRDA   3)	Intermittent GPDs     Final Clinical Correlation:  1)	There were indicators of  bifrontal  cortical dysfunction, irritability, and epileptic potential   2)	Findings consistent with diffuse electrocerebral dysfunction secondary to metabolic etiology and epileptic potential.    Neurology - Consult Note    HPI: Patient HIPOLITO ESCAMILLA is a 74y (1950) woman with a PMHx significant for Parkinson's, DM, HTN, HLD admitted to hospital for evaluation of AMS, found to have acute pulmonary emboli with evidence of right heart strain. Neurology consulted for continued AMS.     Per family at bedside, the patient had an incident a few days prior to admission where she was visibly blue and unresponsive for a few minutes- patient did not want to go to the hospital afterwards. Family brought patient to hospital for continued confusion. They state that 12 days ago, the patient had a medication change for her Parkinson's and depression. She was started on Crexont (previously on carbidopa-levodopa) and Zoloft. Patient has had Parkinson's for past ~20 years. She is unable to perform any of her ADLs at baseline and has a home attendant for 7 hours a day. Patient walks with a walker at baseline. She is alert and oriented x3 at baseline.    CT head : No evidence of acute transcortical infarct, acute intracranial hemorrhage, or mass /effect.  EEG : There were indicators of  bifrontal  cortical dysfunction, irritability, and epileptic potential. Findings consistent with diffuse electrocerebral dysfunction secondary to metabolic etiology and epileptic potential.       Allergies:  No Known Allergies      PMHx/PSHx/Family Hx: As above, otherwise see below   Parkinsons    Hypertension    Hyperlipidemia    Diabetes      Social Hx:  No current use of tobacco, alcohol, or illicit drugs  Lives alone in [place], has home attendant 7 hours a day.    Medications:  MEDICATIONS  (STANDING):  apixaban 10 milliGRAM(s) Oral every 12 hours  clonazePAM Oral Disintegrating Tablet 0.25 milliGRAM(s) Oral two times a day  CREXONT 70mg/280mg Extended release caps 2 Capsule(s) 2 Capsule(s) Oral four times a day  dextrose 5%. 1000 milliLiter(s) (50 mL/Hr) IV Continuous <Continuous>  dextrose 5%. 1000 milliLiter(s) (100 mL/Hr) IV Continuous <Continuous>  dextrose 50% Injectable 25 Gram(s) IV Push once  dextrose 50% Injectable 12.5 Gram(s) IV Push once  dextrose 50% Injectable 25 Gram(s) IV Push once  enalapril 5 milliGRAM(s) Oral daily  glucagon  Injectable 1 milliGRAM(s) IntraMuscular once  insulin lispro (ADMELOG) corrective regimen sliding scale   SubCutaneous three times a day before meals  insulin lispro (ADMELOG) corrective regimen sliding scale   SubCutaneous at bedtime  metoprolol tartrate 50 milliGRAM(s) Oral two times a day  polyethylene glycol 3350 17 Gram(s) Oral two times a day  senna 2 Tablet(s) Oral at bedtime    MEDICATIONS  (PRN):  acetaminophen     Tablet .. 650 milliGRAM(s) Oral every 6 hours PRN Temp greater or equal to 38C (100.4F), Mild Pain (1 - 3)  aluminum hydroxide/magnesium hydroxide/simethicone Suspension 30 milliLiter(s) Oral every 4 hours PRN Dyspepsia  dextrose Oral Gel 15 Gram(s) Oral once PRN Blood Glucose LESS THAN 70 milliGRAM(s)/deciliter  melatonin 3 milliGRAM(s) Oral at bedtime PRN Insomnia  ondansetron Injectable 4 milliGRAM(s) IV Push every 8 hours PRN Nausea and/or Vomiting  zolpidem 5 milliGRAM(s) Oral at bedtime PRN Insomnia      Vitals:  T(C): 36.8 (25 @ 05:33), Max: 37 (25 @ 14:13)  HR: 69 (25 @ 05:33) (69 - 89)  BP: 157/80 (25 @ 05:33) (152/78 - 161/82)  RR: 19 (25 @ 05:33) (18 - 19)  SpO2: 96% (25 @ 05:33) (95% - 97%)    Physical Examination:   General - NAD, pleasant, cooperative   Neurological Exam:   Mental status: Awake, alert, oriented to name, , and year. Does not know where she is or why she is in the hospital.  No dysarthria, no aphasia.    Cranial nerves: Pupils equally round and reactive to light, visual fields full, no nystagmus, extraocular muscles intact, V1 through V3 intact bilaterally and symmetric, face symmetric, hearing intact to finger rub, palate elevation symmetric, tongue was midline.  Motor: Unable to raise b/l LE.  strength 2/5. Cogwheel rigidity noted in b/l UE.     Sensation: Intact to light touch.  Gait: deferred      Labs:                        12.2   7.97  )-----------( 193      ( 2025 07:00 )             36.8     -    142  |  103  |  16  ----------------------------<  112[H]  3.6   |  27  |  0.7    Ca    8.7      2025 07:00  Mg     1.9         TPro  5.3[L]  /  Alb  3.4[L]  /  TBili  0.7  /  DBili  x   /  AST  18  /  ALT  <5  /  AlkPhos  64      CAPILLARY BLOOD GLUCOSE      POCT Blood Glucose.: 119 mg/dL (2025 11:34)    LIVER FUNCTIONS - ( 2025 07:00 )  Alb: 3.4 g/dL / Pro: 5.3 g/dL / ALK PHOS: 64 U/L / ALT: <5 U/L / AST: 18 U/L / GGT: x           Radiology:  CT Head No Cont:  (2025 19:00)  < from: CT Head No Cont (25 @ 19:00) >  FINDINGS:  There is no CT evidence of acute transcortical infarct. Age-related   involutional changes and chronic microvascular ischemic changes.    There is no hydrocephalus, mass effect, or acute intracranial hemorrhage.   No extra-axial collection. Basal cisterns are patent.    The visualized paranasal sinuses and mastoid air cells areclear.    The calvarium is intact.    IMPRESSION:  No evidence of acute transcortical infarct, acute intracranial   hemorrhage, or mass effect.    < end of copied text >    EEG :  Findings:  Background:  continuous, with predominantly theta frequencies.  Generalized Slowing:  Frequent  polymorphic delta and GRDA.   Symmetry/Focality: Frequent (10-49%)  periods of bifrontal  FIRDA    Voltage:  Normal (20+ uV)  Organization:  Appropriate anterior-posterior gradient  Posterior Dominant Rhythm:  8-8.5 Hz symmetric, intermittently maintained.   Sleep:  Absent.  Variability:   Yes		Reactivity:  Yes    Spontaneous Activity:  Frequent ( >1/min < 1/10sec) bifrontal LPDs and GPDs.   Events:  1)	No electrographic seizures or significant clinical events occurred during this study.  Provocations:  •	Hyperventilation: was not performed.  •	Photic stimulation: was not performed.  FINAL Impression:  Abnormal awake and sleep routine EEG  1)	 There was diffuse slowing of electrographic activity  2)	There was bifrontal LPDs and FIRDA   3)	Intermittent GPDs     Final Clinical Correlation:  1)	There were indicators of  bifrontal  cortical dysfunction, irritability, and epileptic potential   2)	Findings consistent with diffuse electrocerebral dysfunction secondary to metabolic etiology and epileptic potential.

## 2025-02-18 NOTE — PROGRESS NOTE ADULT - SUBJECTIVE AND OBJECTIVE BOX
24H events:    Patient is a 74y old Female who presents with a chief complaint of PE w RHS (17 Feb 2025 14:37)    Primary diagnosis of Pulmonary embolism        Today is 5d of hospitalization. This morning patient was seen and examined at bedside, resting comfortably in bed.    No acute or major events overnight.    Pt was oriented this am. Noted high blood pressure, started enalapril 5mg OD this am. Placed a Neurology consult re: EEG read      PAST MEDICAL & SURGICAL HISTORY  Parkinsons    Hypertension    Hyperlipidemia    Diabetes      SOCIAL HISTORY:  Social History:      ALLERGIES:  No Known Allergies    MEDICATIONS:  STANDING MEDICATIONS  apixaban 10 milliGRAM(s) Oral every 12 hours  clonazePAM Oral Disintegrating Tablet 0.25 milliGRAM(s) Oral two times a day  CREXONT 70mg/280mg Extended release caps 2 Capsule(s) 2 Capsule(s) Oral four times a day  dextrose 5%. 1000 milliLiter(s) IV Continuous <Continuous>  dextrose 5%. 1000 milliLiter(s) IV Continuous <Continuous>  dextrose 50% Injectable 25 Gram(s) IV Push once  dextrose 50% Injectable 12.5 Gram(s) IV Push once  dextrose 50% Injectable 25 Gram(s) IV Push once  enalapril 5 milliGRAM(s) Oral daily  glucagon  Injectable 1 milliGRAM(s) IntraMuscular once  insulin lispro (ADMELOG) corrective regimen sliding scale   SubCutaneous three times a day before meals  insulin lispro (ADMELOG) corrective regimen sliding scale   SubCutaneous at bedtime  metoprolol tartrate 50 milliGRAM(s) Oral two times a day  polyethylene glycol 3350 17 Gram(s) Oral two times a day  senna 2 Tablet(s) Oral at bedtime    PRN MEDICATIONS  acetaminophen     Tablet .. 650 milliGRAM(s) Oral every 6 hours PRN  aluminum hydroxide/magnesium hydroxide/simethicone Suspension 30 milliLiter(s) Oral every 4 hours PRN  dextrose Oral Gel 15 Gram(s) Oral once PRN  melatonin 3 milliGRAM(s) Oral at bedtime PRN  ondansetron Injectable 4 milliGRAM(s) IV Push every 8 hours PRN  zolpidem 5 milliGRAM(s) Oral at bedtime PRN    VITALS:   T(F): 98.2  HR: 69  BP: 157/80  RR: 19  SpO2: 96%    PHYSICAL EXAM:        LABS:                        12.2   7.97  )-----------( 193      ( 18 Feb 2025 07:00 )             36.8     02-18    142  |  103  |  16  ----------------------------<  112[H]  3.6   |  27  |  0.7    Ca    8.7      18 Feb 2025 07:00  Mg     1.9     02-18    TPro  5.3[L]  /  Alb  3.4[L]  /  TBili  0.7  /  DBili  x   /  AST  18  /  ALT  <5  /  AlkPhos  64  02-18      Urinalysis Basic - ( 18 Feb 2025 07:00 )    Color: x / Appearance: x / SG: x / pH: x  Gluc: 112 mg/dL / Ketone: x  / Bili: x / Urobili: x   Blood: x / Protein: x / Nitrite: x   Leuk Esterase: x / RBC: x / WBC x   Sq Epi: x / Non Sq Epi: x / Bacteria: x                   24H events:    Patient is a 74y old Female who presents with a chief complaint of PE w RHS (17 Feb 2025 14:37)    Primary diagnosis of Pulmonary embolism        Today is 5d of hospitalization. This morning patient was seen and examined at bedside, resting comfortably in bed.    No acute or major events overnight.    Pt was oriented this am. Noted high blood pressure, started enalapril 5mg OD this am. Placed a Neurology consult re: EEG read      PAST MEDICAL & SURGICAL HISTORY  Parkinsons    Hypertension    Hyperlipidemia    Diabetes      SOCIAL HISTORY:  Social History:      ALLERGIES:  No Known Allergies    MEDICATIONS:  STANDING MEDICATIONS  apixaban 10 milliGRAM(s) Oral every 12 hours  clonazePAM Oral Disintegrating Tablet 0.25 milliGRAM(s) Oral two times a day  CREXONT 70mg/280mg Extended release caps 2 Capsule(s) 2 Capsule(s) Oral four times a day  dextrose 5%. 1000 milliLiter(s) IV Continuous <Continuous>  dextrose 5%. 1000 milliLiter(s) IV Continuous <Continuous>  dextrose 50% Injectable 25 Gram(s) IV Push once  dextrose 50% Injectable 12.5 Gram(s) IV Push once  dextrose 50% Injectable 25 Gram(s) IV Push once  enalapril 5 milliGRAM(s) Oral daily  glucagon  Injectable 1 milliGRAM(s) IntraMuscular once  insulin lispro (ADMELOG) corrective regimen sliding scale   SubCutaneous three times a day before meals  insulin lispro (ADMELOG) corrective regimen sliding scale   SubCutaneous at bedtime  metoprolol tartrate 50 milliGRAM(s) Oral two times a day  polyethylene glycol 3350 17 Gram(s) Oral two times a day  senna 2 Tablet(s) Oral at bedtime    PRN MEDICATIONS  acetaminophen     Tablet .. 650 milliGRAM(s) Oral every 6 hours PRN  aluminum hydroxide/magnesium hydroxide/simethicone Suspension 30 milliLiter(s) Oral every 4 hours PRN  dextrose Oral Gel 15 Gram(s) Oral once PRN  melatonin 3 milliGRAM(s) Oral at bedtime PRN  ondansetron Injectable 4 milliGRAM(s) IV Push every 8 hours PRN  zolpidem 5 milliGRAM(s) Oral at bedtime PRN    VITALS:   T(F): 98.2  HR: 69  BP: 157/80  RR: 19  SpO2: 96%    PHYSICAL EXAM:  GENERAL: NAD, well-developed  HEAD:  Atraumatic, Normocephalic  EYES: conjunctiva and sclera clear  NECK: Supple, No JVD  CHEST/LUNG: Clear to auscultation bilaterally; No wheeze  HEART: Regular rate and rhythm; No murmurs, rubs, or gallops  ABDOMEN: Soft, Nontender, Nondistended; Bowel sounds present  EXTREMITIES:  2+ Peripheral Pulses, No clubbing, cyanosis, or edema  SKIN: No rashes or lesions    LABS:                        12.2   7.97  )-----------( 193      ( 18 Feb 2025 07:00 )             36.8     02-18    142  |  103  |  16  ----------------------------<  112[H]  3.6   |  27  |  0.7    Ca    8.7      18 Feb 2025 07:00  Mg     1.9     02-18    TPro  5.3[L]  /  Alb  3.4[L]  /  TBili  0.7  /  DBili  x   /  AST  18  /  ALT  <5  /  AlkPhos  64  02-18      Urinalysis Basic - ( 18 Feb 2025 07:00 )    Color: x / Appearance: x / SG: x / pH: x  Gluc: 112 mg/dL / Ketone: x  / Bili: x / Urobili: x   Blood: x / Protein: x / Nitrite: x   Leuk Esterase: x / RBC: x / WBC x   Sq Epi: x / Non Sq Epi: x / Bacteria: x

## 2025-02-18 NOTE — PROGRESS NOTE ADULT - ATTENDING COMMENTS
74F PMH Parkinson's, DM2, HTN, HLD presents for evaluation of Altered Mental Status. Found to have acute pulmonary embolism.    #Acute Bilateral PEs with RHS  -CT Chest PE protocol showing acute PE with evidence of right heart strain  -No intervention per IR   -Switch to Eliquis 10mg po BID x7 days then 5mg po bid for at least 6 months and to f/u with Hematologist outpt   -d/w son plan of care at bedside     #Encephalopathy Rule out Seizures vs Delirium  - CTH unremarkable  - UCx negative   - off abx   - rEEG can not ruled out epileptiform activity therefore consulted Neurologist and per son at bedside his Mother is not like herself at all although AOx3 is speaking none sense information.     #Parkinson  -C/w crexont, clonazepam, ambien PRN for eszipiclone  -Son states CREXONT is a new medication and wondering if it can cause confusion and AMS    #DM2  #HTN  #HLD  -ISS for metformin, A1C  -Lopressor 50 BID for nebivolol 5 BID  -CAPILLARY BLOOD GLUCOSE  POCT Blood Glucose.: 119 mg/dL (18 Feb 2025 11:34)  POCT Blood Glucose.: 121 mg/dL (18 Feb 2025 08:02)  POCT Blood Glucose.: 121 mg/dL (17 Feb 2025 16:37)      DVT PPX- Eliquis   Gi Proph- Protonix     #Progress Note Handoff  Pending (specify):  STR placement and Neurology consultation   Plan of care d/w son at bedside and he wants Hospitalist and Neurology to reach out to him daily - MICHELLE [phone]  Dispo: STR in [place] 74F PMH Parkinson's, DM2, HTN, HLD presents for evaluation of Altered Mental Status. Found to have acute pulmonary embolism.    #Acute Bilateral PEs with RHS  -CT Chest PE protocol showing acute PE with evidence of right heart strain  -No intervention per IR   -Switch to Eliquis 10mg po BID x7 days then 5mg po bid for at least 6 months and to f/u with Hematologist outpt   -d/w son plan of care at bedside   -Pt will need f/u with PMD to screen for malignancy and hypercoagulable state     #Encephalopathy Rule out Seizures vs Delirium  - CTH unremarkable  - UCx negative   - off abx   - rEEG can not ruled out epileptiform activity therefore consulted Neurologist and per son at bedside his Mother is not like herself at all although AOx3 is speaking none sense information.     #Parkinson  -C/w crexont, clonazepam, ambien PRN for eszipiclone  -Son states CREXONT is a new medication and wondering if it can cause confusion and AMS    #DM2  #HTN  #HLD  -ISS for metformin, A1C  -Lopressor 50 BID for nebivolol 5 BID  -CAPILLARY BLOOD GLUCOSE  POCT Blood Glucose.: 119 mg/dL (18 Feb 2025 11:34)  POCT Blood Glucose.: 121 mg/dL (18 Feb 2025 08:02)  POCT Blood Glucose.: 121 mg/dL (17 Feb 2025 16:37)      DVT PPX- Eliquis   Gi Proph- Protonix     #Progress Note Handoff  Pending (specify):  STR placement and Neurology consultation   Plan of care d/w son at bedside and he wants Hospitalist and Neurology to reach out to him daily - MICHELLE [phone]  Dispo: STR in [place]

## 2025-02-18 NOTE — CONSULT NOTE ADULT - ATTENDING COMMENTS
Patient seen and examined and agree with above except as noted.  Patients history, notes ,labs, imaging, vitals and meds reviewed personally.  Says she feels better then yesterday  Admits to some anxiety and depression   Admits to being more tremulous today  L>R rigidity  Oral dyskinesias    Plan  1. No epileptiform abnormlaities on VEEG  2. DC VEEG  3. Unlikely seizure as etiology for admission  4. Follow up with her Neurologist for Parkinsons medication adjustments
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  At the time of admission, patient was found incidentally to have a pulmonary embolism on abdominal imaging.  Subsequent CT PE demonstrated bilateral distal main PA emboli with evidence of right heart strain.  Patient was found to have minimally elevated serum troponin with no other biomarkers consistent with right heart strain.  She was admitted to medical telemetry, and has remained stable on therapeutic anticoagulation for approaching 24 hours.  Recommend completing workup with formal echocardiogram and a lower extremity duplex bilaterally.  Continue with therapeutic anticoagulation with LMWH.  Given patient's low to moderate risk, suspect she would not benefit from mechanical thrombectomy.  Okay to maintain on medical telemetry.  Ensure age-appropriate cancer screenings, and patient should follow-up with hematology.  Complete at least 3 to 6 months of therapeutic anticoagulation.

## 2025-02-18 NOTE — CONSULT NOTE ADULT - CONSULT REASON
PEs with GLENNS
PE
"Please evaluate the pt for abnormal EEG read iso of altered mental status, with PMH parkinsons and provide recommendations"

## 2025-02-18 NOTE — CONSULT NOTE ADULT - ASSESSMENT
The patient is a 74-year-old woman with a PMHx significant for Parkinson's, DM, HTN, HLD admitted to hospital for evaluation of AMS, found to have acute pulmonary emboli with evidence of right heart strain. Neurology consulted for continued AMS.  The patient is a 74-year-old woman with a PMHx significant for Parkinson's, DM, HTN, HLD admitted to hospital for evaluation of AMS, found to have acute pulmonary emboli with evidence of right heart strain. Neurology consulted for continued AMS. EEG abnormal and showing indicators of bifrontal cortical dysfunction, irritability, and epileptic potential.     RECOMMENDATIONS:  -vEEG

## 2025-02-19 LAB
ALBUMIN SERPL ELPH-MCNC: 3.7 G/DL — SIGNIFICANT CHANGE UP (ref 3.5–5.2)
ALP SERPL-CCNC: 72 U/L — SIGNIFICANT CHANGE UP (ref 30–115)
ALT FLD-CCNC: <5 U/L — SIGNIFICANT CHANGE UP (ref 0–41)
ANION GAP SERPL CALC-SCNC: 14 MMOL/L — SIGNIFICANT CHANGE UP (ref 7–14)
AST SERPL-CCNC: 17 U/L — SIGNIFICANT CHANGE UP (ref 0–41)
BASOPHILS # BLD AUTO: 0.02 K/UL — SIGNIFICANT CHANGE UP (ref 0–0.2)
BASOPHILS NFR BLD AUTO: 0.3 % — SIGNIFICANT CHANGE UP (ref 0–1)
BILIRUB SERPL-MCNC: 1 MG/DL — SIGNIFICANT CHANGE UP (ref 0.2–1.2)
BUN SERPL-MCNC: 17 MG/DL — SIGNIFICANT CHANGE UP (ref 10–20)
CALCIUM SERPL-MCNC: 9.1 MG/DL — SIGNIFICANT CHANGE UP (ref 8.4–10.4)
CHLORIDE SERPL-SCNC: 103 MMOL/L — SIGNIFICANT CHANGE UP (ref 98–110)
CO2 SERPL-SCNC: 26 MMOL/L — SIGNIFICANT CHANGE UP (ref 17–32)
CREAT SERPL-MCNC: 0.8 MG/DL — SIGNIFICANT CHANGE UP (ref 0.7–1.5)
EGFR: 78 ML/MIN/1.73M2 — SIGNIFICANT CHANGE UP
EOSINOPHIL # BLD AUTO: 0.1 K/UL — SIGNIFICANT CHANGE UP (ref 0–0.7)
EOSINOPHIL NFR BLD AUTO: 1.3 % — SIGNIFICANT CHANGE UP (ref 0–8)
GLUCOSE BLDC GLUCOMTR-MCNC: 107 MG/DL — HIGH (ref 70–99)
GLUCOSE BLDC GLUCOMTR-MCNC: 113 MG/DL — HIGH (ref 70–99)
GLUCOSE BLDC GLUCOMTR-MCNC: 121 MG/DL — HIGH (ref 70–99)
GLUCOSE BLDC GLUCOMTR-MCNC: 123 MG/DL — HIGH (ref 70–99)
GLUCOSE SERPL-MCNC: 97 MG/DL — SIGNIFICANT CHANGE UP (ref 70–99)
HCT VFR BLD CALC: 37.7 % — SIGNIFICANT CHANGE UP (ref 37–47)
HGB BLD-MCNC: 12.6 G/DL — SIGNIFICANT CHANGE UP (ref 12–16)
IMM GRANULOCYTES NFR BLD AUTO: 0.1 % — SIGNIFICANT CHANGE UP (ref 0.1–0.3)
LYMPHOCYTES # BLD AUTO: 1.37 K/UL — SIGNIFICANT CHANGE UP (ref 1.2–3.4)
LYMPHOCYTES # BLD AUTO: 18 % — LOW (ref 20.5–51.1)
MAGNESIUM SERPL-MCNC: 1.8 MG/DL — SIGNIFICANT CHANGE UP (ref 1.8–2.4)
MCHC RBC-ENTMCNC: 30.6 PG — SIGNIFICANT CHANGE UP (ref 27–31)
MCHC RBC-ENTMCNC: 33.4 G/DL — SIGNIFICANT CHANGE UP (ref 32–37)
MCV RBC AUTO: 91.5 FL — SIGNIFICANT CHANGE UP (ref 81–99)
MONOCYTES # BLD AUTO: 0.32 K/UL — SIGNIFICANT CHANGE UP (ref 0.1–0.6)
MONOCYTES NFR BLD AUTO: 4.2 % — SIGNIFICANT CHANGE UP (ref 1.7–9.3)
NEUTROPHILS # BLD AUTO: 5.8 K/UL — SIGNIFICANT CHANGE UP (ref 1.4–6.5)
NEUTROPHILS NFR BLD AUTO: 76.1 % — HIGH (ref 42.2–75.2)
NRBC BLD AUTO-RTO: 0 /100 WBCS — SIGNIFICANT CHANGE UP (ref 0–0)
PLATELET # BLD AUTO: 211 K/UL — SIGNIFICANT CHANGE UP (ref 130–400)
PMV BLD: 10.9 FL — HIGH (ref 7.4–10.4)
POTASSIUM SERPL-MCNC: 4.1 MMOL/L — SIGNIFICANT CHANGE UP (ref 3.5–5)
POTASSIUM SERPL-SCNC: 4.1 MMOL/L — SIGNIFICANT CHANGE UP (ref 3.5–5)
PROT SERPL-MCNC: 5.8 G/DL — LOW (ref 6–8)
RBC # BLD: 4.12 M/UL — LOW (ref 4.2–5.4)
RBC # FLD: 13.2 % — SIGNIFICANT CHANGE UP (ref 11.5–14.5)
SODIUM SERPL-SCNC: 143 MMOL/L — SIGNIFICANT CHANGE UP (ref 135–146)
WBC # BLD: 7.62 K/UL — SIGNIFICANT CHANGE UP (ref 4.8–10.8)
WBC # FLD AUTO: 7.62 K/UL — SIGNIFICANT CHANGE UP (ref 4.8–10.8)

## 2025-02-19 PROCEDURE — 99222 1ST HOSP IP/OBS MODERATE 55: CPT

## 2025-02-19 PROCEDURE — 99231 SBSQ HOSP IP/OBS SF/LOW 25: CPT

## 2025-02-19 PROCEDURE — 95720 EEG PHY/QHP EA INCR W/VEEG: CPT

## 2025-02-19 RX ORDER — APIXABAN 2.5 MG/1
10 TABLET, FILM COATED ORAL EVERY 12 HOURS
Refills: 0 | Status: DISCONTINUED | OUTPATIENT
Start: 2025-02-19 | End: 2025-02-20

## 2025-02-19 RX ORDER — AMLODIPINE BESYLATE 10 MG/1
2.5 TABLET ORAL DAILY
Refills: 0 | Status: DISCONTINUED | OUTPATIENT
Start: 2025-02-20 | End: 2025-02-20

## 2025-02-19 RX ORDER — BISACODYL 5 MG
5 TABLET, DELAYED RELEASE (ENTERIC COATED) ORAL AT BEDTIME
Refills: 0 | Status: DISCONTINUED | OUTPATIENT
Start: 2025-02-19 | End: 2025-02-20

## 2025-02-19 RX ORDER — APIXABAN 2.5 MG/1
5 TABLET, FILM COATED ORAL EVERY 12 HOURS
Refills: 0 | Status: CANCELLED | OUTPATIENT
Start: 2025-02-24 | End: 2025-02-20

## 2025-02-19 RX ORDER — LACTULOSE 10 G/15ML
20 SOLUTION ORAL ONCE
Refills: 0 | Status: COMPLETED | OUTPATIENT
Start: 2025-02-19 | End: 2025-02-19

## 2025-02-19 RX ADMIN — Medication 650 MILLIGRAM(S): at 13:12

## 2025-02-19 RX ADMIN — APIXABAN 10 MILLIGRAM(S): 2.5 TABLET, FILM COATED ORAL at 05:13

## 2025-02-19 RX ADMIN — CLONAZEPAM 0.25 MILLIGRAM(S): 0.5 TABLET ORAL at 17:28

## 2025-02-19 RX ADMIN — CLONAZEPAM 0.25 MILLIGRAM(S): 0.5 TABLET ORAL at 05:13

## 2025-02-19 RX ADMIN — Medication 5 MILLIGRAM(S): at 05:12

## 2025-02-19 RX ADMIN — Medication 5 MILLIGRAM(S): at 23:49

## 2025-02-19 RX ADMIN — Medication 650 MILLIGRAM(S): at 12:42

## 2025-02-19 RX ADMIN — POLYETHYLENE GLYCOL 3350 17 GRAM(S): 17 POWDER, FOR SOLUTION ORAL at 17:28

## 2025-02-19 RX ADMIN — METOPROLOL SUCCINATE 50 MILLIGRAM(S): 50 TABLET, EXTENDED RELEASE ORAL at 05:13

## 2025-02-19 RX ADMIN — LACTULOSE 20 GRAM(S): 10 SOLUTION ORAL at 17:28

## 2025-02-19 RX ADMIN — APIXABAN 10 MILLIGRAM(S): 2.5 TABLET, FILM COATED ORAL at 17:28

## 2025-02-19 RX ADMIN — POLYETHYLENE GLYCOL 3350 17 GRAM(S): 17 POWDER, FOR SOLUTION ORAL at 05:13

## 2025-02-19 NOTE — EEG REPORT - NS EEG TEXT BOX
[facility] Department of Neurology         Inpatient Routine-Electroencephalography Report    Patient Name:	HIPOLITO ESCAMILLA    :	1950  MRN:	-    Study Start Date/Time:	2025, 4:10:11 PM    End Time (if applicable):      Brief Clinical History:  HIPOLITO ESCAMILLA is a 74 year old -; study performed to investigate for seizures or markers of epilepsy.   Technologist notes: AMS, CONFUSION, PARKINSONS, DM, HTN, HLD,     Diagnosis Code:  R56.9 convulsions/seizure  CPT:   78604 (awake/drowsy)     Pertinent Medication:  n/a    Acquisition Details:  Electroencephalography was acquired using a minimum of 21 channels on an [employer] Neu[employer] system v 9.3.1 with electrode placement according to the standard International 10-20 system following ACNS (American Clinical Neurophysiology Society) guidelines.  Anterior temporal T1 and T2 electrodes were utilized whenever possible.  The [employer] automated spike & seizure detections were all reviewed in detail, in addition to the entire raw EEG.    Findings:  Background:  continuous, with predominantly theta frequencies.  Generalized Slowing:  Frequent  polymorphic delta and GRDA.   Symmetry/Focality: Frequent (10-49%)  periods of bifrontal  FIRDA    Voltage:  Normal (20+ uV)  Organization:  Appropriate anterior-posterior gradient  Posterior Dominant Rhythm:  8-8.5 Hz symmetric, intermittently maintained.   Sleep:  Absent.  Variability:   Yes		Reactivity:  Yes    Spontaneous Activity:  Frequent ( >1/min < 1/10sec) bifrontal LPDs and GPDs.   Events:  1)	No electrographic seizures or significant clinical events occurred during this study.  Provocations:  •	Hyperventilation: was not performed.  •	Photic stimulation: was not performed.  FINAL Impression:  Abnormalawake and sleep routine EEG  1)	 There was diffuse slowing of electrographic activity  2)	There was bifrontal LPDs and FIRDA   3)	Intermittent GPDs     Final Clinical Correlation:  1)	There were indicators of  bifrontal  cortical dysfunction, irritability, and epileptic potential   2)	Findings consistent with diffuse electrocerebral dysfunction secondary to metabolic etiology and epileptic potential.           Ciara Abbott MD   Attending Neurologist, [facility] Epilepsy Program    
Epilepsy Attending Note:     ANDIANABELLEHIPOLITO    74y Female  MRN [id number]    Vital Signs Last 24 Hrs  T(C): 36.6 (2025 05:10), Max: 36.7 (2025 21:30)  T(F): 97.8 (2025 05:10), Max: 98.1 (2025 21:30)  HR: 76 (2025 05:10) (68 - 88)  BP: 154/74 (2025 05:10) (117/74 - 154/74)  BP(mean): --  RR: 18 (2025 05:10) (18 - 19)  SpO2: 96% (2025 05:10) (95% - 97%)    Parameters below as of 2025 05:10  Patient On (Oxygen Delivery Method): room air                              12.6   7.62  )-----------( 211      ( 2025 07:18 )             37.7       -    143  |  103  |  17  ----------------------------<  97  4.1   |  26  |  0.8    Ca    9.1      2025 07:18  Mg     1.8         TPro  5.8[L]  /  Alb  3.7  /  TBili  1.0  /  DBili  x   /  AST  17  /  ALT  x   /  AlkPhos  72        MEDICATIONS  (STANDING):  apixaban 10 milliGRAM(s) Oral every 12 hours  clonazePAM Oral Disintegrating Tablet 0.25 milliGRAM(s) Oral two times a day  CREXONT 70mg/280mg Extended release caps 2 Capsule(s) 2 Capsule(s) Oral four times a day  dextrose 5%. 1000 milliLiter(s) (50 mL/Hr) IV Continuous <Continuous>  dextrose 5%. 1000 milliLiter(s) (100 mL/Hr) IV Continuous <Continuous>  dextrose 50% Injectable 25 Gram(s) IV Push once  dextrose 50% Injectable 12.5 Gram(s) IV Push once  dextrose 50% Injectable 25 Gram(s) IV Push once  enalapril 5 milliGRAM(s) Oral daily  glucagon  Injectable 1 milliGRAM(s) IntraMuscular once  insulin lispro (ADMELOG) corrective regimen sliding scale   SubCutaneous three times a day before meals  insulin lispro (ADMELOG) corrective regimen sliding scale   SubCutaneous at bedtime  metoprolol tartrate 50 milliGRAM(s) Oral two times a day  polyethylene glycol 3350 17 Gram(s) Oral two times a day  senna 2 Tablet(s) Oral at bedtime    MEDICATIONS  (PRN):  acetaminophen     Tablet .. 650 milliGRAM(s) Oral every 6 hours PRN Temp greater or equal to 38C (100.4F), Mild Pain (1 - 3)  aluminum hydroxide/magnesium hydroxide/simethicone Suspension 30 milliLiter(s) Oral every 4 hours PRN Dyspepsia  dextrose Oral Gel 15 Gram(s) Oral once PRN Blood Glucose LESS THAN 70 milliGRAM(s)/deciliter  melatonin 3 milliGRAM(s) Oral at bedtime PRN Insomnia  ondansetron Injectable 4 milliGRAM(s) IV Push every 8 hours PRN Nausea and/or Vomiting  zolpidem 5 milliGRAM(s) Oral at bedtime PRN Insomnia            VEEG in the last 10 hours:    Background - continuous, symmetrical, less than optimally organized, reactive, reaching frequencies in the range of 6-7 Hz superimposed by small amount of lower range theta    Focal and generalized slowin. moderate generalized slowing  2. moderate bifrontal focal slowing with shifting asymmetry    Interictal activity - none    Events - none    Seizures - none    Impression: VEEG as above    Plan - per neurology team

## 2025-02-19 NOTE — PROGRESS NOTE ADULT - SUBJECTIVE AND OBJECTIVE BOX
24H events:    Patient is a 74y old Female who presents with a chief complaint of PE w RHS (18 Feb 2025 13:11)    Primary diagnosis of Pulmonary embolism        Today is 6d of hospitalization. This morning patient was seen and examined at bedside, resting comfortably in bed.    No acute or major events overnight.    sp vEEG, no seizures, just slowing, no need for anti-epileptics    Pt anticipated for discharge soon.          PAST MEDICAL & SURGICAL HISTORY  Parkinsons    Hypertension    Hyperlipidemia    Diabetes      SOCIAL HISTORY:  Social History:      ALLERGIES:  No Known Allergies    MEDICATIONS:  STANDING MEDICATIONS  apixaban 10 milliGRAM(s) Oral every 12 hours  clonazePAM Oral Disintegrating Tablet 0.25 milliGRAM(s) Oral two times a day  CREXONT 70mg/280mg Extended release caps 2 Capsule(s) 2 Capsule(s) Oral four times a day  dextrose 5%. 1000 milliLiter(s) IV Continuous <Continuous>  dextrose 5%. 1000 milliLiter(s) IV Continuous <Continuous>  dextrose 50% Injectable 25 Gram(s) IV Push once  dextrose 50% Injectable 12.5 Gram(s) IV Push once  dextrose 50% Injectable 25 Gram(s) IV Push once  enalapril 5 milliGRAM(s) Oral daily  glucagon  Injectable 1 milliGRAM(s) IntraMuscular once  insulin lispro (ADMELOG) corrective regimen sliding scale   SubCutaneous three times a day before meals  insulin lispro (ADMELOG) corrective regimen sliding scale   SubCutaneous at bedtime  metoprolol tartrate 50 milliGRAM(s) Oral two times a day  polyethylene glycol 3350 17 Gram(s) Oral two times a day  senna 2 Tablet(s) Oral at bedtime    PRN MEDICATIONS  acetaminophen     Tablet .. 650 milliGRAM(s) Oral every 6 hours PRN  aluminum hydroxide/magnesium hydroxide/simethicone Suspension 30 milliLiter(s) Oral every 4 hours PRN  dextrose Oral Gel 15 Gram(s) Oral once PRN  melatonin 3 milliGRAM(s) Oral at bedtime PRN  ondansetron Injectable 4 milliGRAM(s) IV Push every 8 hours PRN  zolpidem 5 milliGRAM(s) Oral at bedtime PRN    VITALS:   T(F): 97.8  HR: 76  BP: 154/74  RR: 18  SpO2: 96%    PHYSICAL EXAM:  GENERAL: NAD, well-developed  HEAD:  Atraumatic, Normocephalic  EYES: conjunctiva and sclera clear  NECK: Supple, No JVD  CHEST/LUNG: Clear to auscultation bilaterally; No wheeze  HEART: Regular rate and rhythm; No murmurs, rubs, or gallops  ABDOMEN: Soft, Nontender, Nondistended; Bowel sounds present  EXTREMITIES:  2+ Peripheral Pulses, No clubbing, cyanosis, or edema  SKIN: No rashes or lesions    LABS:                        12.6   7.62  )-----------( 211      ( 19 Feb 2025 07:18 )             37.7     02-19    143  |  103  |  17  ----------------------------<  97  4.1   |  26  |  0.8    Ca    9.1      19 Feb 2025 07:18  Mg     1.8     02-19    TPro  5.8[L]  /  Alb  3.7  /  TBili  1.0  /  DBili  x   /  AST  17  /  ALT  <5  /  AlkPhos  72  02-19      Urinalysis Basic - ( 19 Feb 2025 07:18 )    Color: x / Appearance: x / SG: x / pH: x  Gluc: 97 mg/dL / Ketone: x  / Bili: x / Urobili: x   Blood: x / Protein: x / Nitrite: x   Leuk Esterase: x / RBC: x / WBC x   Sq Epi: x / Non Sq Epi: x / Bacteria: x

## 2025-02-19 NOTE — PROGRESS NOTE ADULT - ASSESSMENT
74F PMH Parkinson's, DM2, HTN, HLD presents for evaluation of Altered Mental Status. Found to have acute pulmonary embolism.    #Acute Bilateral PEs with RHS, LE DVT   -No intervention per IR   -Eliquis 10mg po BID x7 days then 5mg po bid for at least 6 months and to f/u with Hematologist outpt   -Pt will need f/u with PMD to screen for malignancy and hypercoagulable state     #Encephalopathy Rule out Seizures vs Delirium  - CTH unremarkable  - UCx negative   - off abx   - VEEG neg, s/p Neuro eval,   - pt back to baseline   #Parkinson  -C/w crexont, clonazepam, ambien   -Son states CREXONT is a new medication and wondering if it can cause confusion and AMS    #DM2  #HTN  #HLD  -ISS  -Lopressor 50 BID for nebivolol 5 BID        DVT PPX- Eliquis   Gi Proph- Protonix     #Progress Note Handoff  Pending (specify):  STR placement  son MICHELLE [phone]     74F PMH Parkinson's, DM2, HTN, HLD presents for evaluation of Altered Mental Status. Found to have acute pulmonary embolism.    #Acute Bilateral PEs with RHS, LE DVT   -No intervention per IR   -Eliquis 10mg po BID x7 days then 5mg po bid for at least 6 months and to f/u with Hematologist outpt   -Pt will need f/u with PMD to screen for malignancy and hypercoagulable state     #Encephalopathy Rule out Seizures vs Delirium  - CTH unremarkable  - UCx negative   - off abx   - VEEG neg, s/p Neuro eval,   - pt back to baseline   #Parkinson  -C/w crexont, clonazepam, ambien   -Son states CREXONT is a new medication and wondering if it can cause confusion and AMS    #constipation: add lactulose and dulcolax     #DM2  #HTN  #HLD  -ISS  - norvasc 2.5mg qd for nebivolol 5 BID        DVT PPX- Eliquis   Gi Proph- Protonix     #Progress Note Handoff  Pending (specify):  DC to STR placement  radha MARTINEZ called and updated on 2/19, [phone]

## 2025-02-19 NOTE — PROGRESS NOTE ADULT - ASSESSMENT
75 y/o female PMH Parkinson's, DM, HTN, HLD presents for evaluation of Altered Mental Status. Found to have acute pulmonary embolism.    #Acute Bilateral PEs with RHS  CT Chest PE protocol showing acute PE with evidence of right heart strain  -No intervention per IR, hencec Switch to Eliquis (medication ordered correctly this am, loading for 7 days and then maintenance)    #Delirium, hospital acquired   - CTH unremarkable  - UCx negative   - off abx   - rEEG done, likely 2/2 to toxic state  - sp vEEG, no seizures, just slowing, no need for anti-epileptics per my conversation with the neurology team    #Parkinson  -C/w crexont, clonazepam, ambien PRN for Eszopiclone  - Son states CREXONT is a new medication and wondering if it can cause confusion and AMS    #DM  #HTN  #HLD  -ISS for metformin, A1C  - Lopressor 50 BID for nebivolol 5 BID  - Started enalapril 5mg OD this am    DVT PPX- Eliquis     #Progress Note Handoff  Pending (specify):  STR placement  Plan of care d/w son at bedside   Dispo: STR in [place]

## 2025-02-19 NOTE — PROGRESS NOTE ADULT - SUBJECTIVE AND OBJECTIVE BOX
Patient is a 74y old  Female who presents with a chief complaint of PE w RHS (19 Feb 2025 11:24)      OVERNIGHT EVENTS: no major events reported     SUBJECTIVE / INTERVAL HPI: Patient seen and examined at bedside.     VITAL SIGNS:  Vital Signs Last 24 Hrs  T(C): 36.7 (19 Feb 2025 11:21), Max: 36.7 (18 Feb 2025 21:30)  T(F): 98 (19 Feb 2025 11:21), Max: 98.1 (18 Feb 2025 21:30)  HR: 63 (19 Feb 2025 11:21) (63 - 88)  BP: 117/74 (19 Feb 2025 11:21) (117/74 - 154/74)  BP(mean): --  RR: 16 (19 Feb 2025 11:21) (16 - 19)  SpO2: 96% (19 Feb 2025 11:21) (95% - 96%)    Parameters below as of 19 Feb 2025 05:10  Patient On (Oxygen Delivery Method): room air        PHYSICAL EXAM:    General: old lady not in distress   HEENT: NC/AT; PERRL, clear conjunctiva  Neck: supple  Cardiovascular: +S1/S2; RRR  Respiratory: CTA b/l; no W/R/R  Gastrointestinal: soft, NT/ND; +BSx4  Extremities: WWP; 2+ peripheral pulses; no edema   Neurological: AAOx3; no focal deficits    MEDICATIONS:  MEDICATIONS  (STANDING):  apixaban 10 milliGRAM(s) Oral every 12 hours  clonazePAM Oral Disintegrating Tablet 0.25 milliGRAM(s) Oral two times a day  CREXONT 70mg/280mg Extended release caps 2 Capsule(s) 2 Capsule(s) Oral four times a day  dextrose 5%. 1000 milliLiter(s) (50 mL/Hr) IV Continuous <Continuous>  dextrose 5%. 1000 milliLiter(s) (100 mL/Hr) IV Continuous <Continuous>  dextrose 50% Injectable 25 Gram(s) IV Push once  dextrose 50% Injectable 12.5 Gram(s) IV Push once  dextrose 50% Injectable 25 Gram(s) IV Push once  enalapril 5 milliGRAM(s) Oral daily  glucagon  Injectable 1 milliGRAM(s) IntraMuscular once  insulin lispro (ADMELOG) corrective regimen sliding scale   SubCutaneous three times a day before meals  insulin lispro (ADMELOG) corrective regimen sliding scale   SubCutaneous at bedtime  metoprolol tartrate 50 milliGRAM(s) Oral two times a day  polyethylene glycol 3350 17 Gram(s) Oral two times a day  senna 2 Tablet(s) Oral at bedtime    MEDICATIONS  (PRN):  acetaminophen     Tablet .. 650 milliGRAM(s) Oral every 6 hours PRN Temp greater or equal to 38C (100.4F), Mild Pain (1 - 3)  aluminum hydroxide/magnesium hydroxide/simethicone Suspension 30 milliLiter(s) Oral every 4 hours PRN Dyspepsia  dextrose Oral Gel 15 Gram(s) Oral once PRN Blood Glucose LESS THAN 70 milliGRAM(s)/deciliter  melatonin 3 milliGRAM(s) Oral at bedtime PRN Insomnia  ondansetron Injectable 4 milliGRAM(s) IV Push every 8 hours PRN Nausea and/or Vomiting  zolpidem 5 milliGRAM(s) Oral at bedtime PRN Insomnia      ALLERGIES:  Allergies    No Known Allergies    Intolerances        LABS:                        12.6   7.62  )-----------( 211      ( 19 Feb 2025 07:18 )             37.7     02-19    143  |  103  |  17  ----------------------------<  97  4.1   |  26  |  0.8    Ca    9.1      19 Feb 2025 07:18  Mg     1.8     02-19    TPro  5.8[L]  /  Alb  3.7  /  TBili  1.0  /  DBili  x   /  AST  17  /  ALT  <5  /  AlkPhos  72  02-19      Urinalysis Basic - ( 19 Feb 2025 07:18 )    Color: x / Appearance: x / SG: x / pH: x  Gluc: 97 mg/dL / Ketone: x  / Bili: x / Urobili: x   Blood: x / Protein: x / Nitrite: x   Leuk Esterase: x / RBC: x / WBC x   Sq Epi: x / Non Sq Epi: x / Bacteria: x      CAPILLARY BLOOD GLUCOSE      POCT Blood Glucose.: 113 mg/dL (19 Feb 2025 11:25)      RADIOLOGY & ADDITIONAL TESTS: Reviewed.    ASSESSMENT:    PLAN:

## 2025-02-20 ENCOUNTER — TRANSCRIPTION ENCOUNTER (OUTPATIENT)
Age: 75
End: 2025-02-20

## 2025-02-20 VITALS
TEMPERATURE: 98 F | DIASTOLIC BLOOD PRESSURE: 70 MMHG | OXYGEN SATURATION: 95 % | HEART RATE: 104 BPM | RESPIRATION RATE: 15 BRPM | SYSTOLIC BLOOD PRESSURE: 104 MMHG

## 2025-02-20 LAB
ANION GAP SERPL CALC-SCNC: 16 MMOL/L — HIGH (ref 7–14)
BASOPHILS # BLD AUTO: 0.01 K/UL — SIGNIFICANT CHANGE UP (ref 0–0.2)
BASOPHILS NFR BLD AUTO: 0.1 % — SIGNIFICANT CHANGE UP (ref 0–1)
BUN SERPL-MCNC: 20 MG/DL — SIGNIFICANT CHANGE UP (ref 10–20)
CALCIUM SERPL-MCNC: 8.5 MG/DL — SIGNIFICANT CHANGE UP (ref 8.4–10.5)
CHLORIDE SERPL-SCNC: 103 MMOL/L — SIGNIFICANT CHANGE UP (ref 98–110)
CO2 SERPL-SCNC: 22 MMOL/L — SIGNIFICANT CHANGE UP (ref 17–32)
CREAT SERPL-MCNC: 0.6 MG/DL — LOW (ref 0.7–1.5)
EGFR: 95 ML/MIN/1.73M2 — SIGNIFICANT CHANGE UP
EOSINOPHIL # BLD AUTO: 0.06 K/UL — SIGNIFICANT CHANGE UP (ref 0–0.7)
EOSINOPHIL NFR BLD AUTO: 0.8 % — SIGNIFICANT CHANGE UP (ref 0–8)
GLUCOSE BLDC GLUCOMTR-MCNC: 109 MG/DL — HIGH (ref 70–99)
GLUCOSE BLDC GLUCOMTR-MCNC: 161 MG/DL — HIGH (ref 70–99)
GLUCOSE SERPL-MCNC: 111 MG/DL — HIGH (ref 70–99)
HCT VFR BLD CALC: 37.9 % — SIGNIFICANT CHANGE UP (ref 37–47)
HGB BLD-MCNC: 13 G/DL — SIGNIFICANT CHANGE UP (ref 12–16)
IMM GRANULOCYTES NFR BLD AUTO: 0.3 % — SIGNIFICANT CHANGE UP (ref 0.1–0.3)
LYMPHOCYTES # BLD AUTO: 1.42 K/UL — SIGNIFICANT CHANGE UP (ref 1.2–3.4)
LYMPHOCYTES # BLD AUTO: 18.2 % — LOW (ref 20.5–51.1)
MAGNESIUM SERPL-MCNC: 1.9 MG/DL — SIGNIFICANT CHANGE UP (ref 1.8–2.4)
MCHC RBC-ENTMCNC: 31.3 PG — HIGH (ref 27–31)
MCHC RBC-ENTMCNC: 34.3 G/DL — SIGNIFICANT CHANGE UP (ref 32–37)
MCV RBC AUTO: 91.3 FL — SIGNIFICANT CHANGE UP (ref 81–99)
MONOCYTES # BLD AUTO: 0.39 K/UL — SIGNIFICANT CHANGE UP (ref 0.1–0.6)
MONOCYTES NFR BLD AUTO: 5 % — SIGNIFICANT CHANGE UP (ref 1.7–9.3)
NEUTROPHILS # BLD AUTO: 5.89 K/UL — SIGNIFICANT CHANGE UP (ref 1.4–6.5)
NEUTROPHILS NFR BLD AUTO: 75.6 % — HIGH (ref 42.2–75.2)
NRBC BLD AUTO-RTO: 0 /100 WBCS — SIGNIFICANT CHANGE UP (ref 0–0)
PLATELET # BLD AUTO: 194 K/UL — SIGNIFICANT CHANGE UP (ref 130–400)
PMV BLD: 10.8 FL — HIGH (ref 7.4–10.4)
POTASSIUM SERPL-MCNC: 3.9 MMOL/L — SIGNIFICANT CHANGE UP (ref 3.5–5)
POTASSIUM SERPL-SCNC: 3.9 MMOL/L — SIGNIFICANT CHANGE UP (ref 3.5–5)
RBC # BLD: 4.15 M/UL — LOW (ref 4.2–5.4)
RBC # FLD: 13.4 % — SIGNIFICANT CHANGE UP (ref 11.5–14.5)
SODIUM SERPL-SCNC: 141 MMOL/L — SIGNIFICANT CHANGE UP (ref 135–146)
WBC # BLD: 7.79 K/UL — SIGNIFICANT CHANGE UP (ref 4.8–10.8)
WBC # FLD AUTO: 7.79 K/UL — SIGNIFICANT CHANGE UP (ref 4.8–10.8)

## 2025-02-20 PROCEDURE — 99239 HOSP IP/OBS DSCHRG MGMT >30: CPT

## 2025-02-20 RX ORDER — SENNA 187 MG
2 TABLET ORAL
Qty: 60 | Refills: 0
Start: 2025-02-20 | End: 2025-03-21

## 2025-02-20 RX ORDER — APIXABAN 2.5 MG/1
2 TABLET, FILM COATED ORAL
Qty: 0 | Refills: 0 | DISCHARGE
Start: 2025-02-20

## 2025-02-20 RX ORDER — APIXABAN 2.5 MG/1
1 TABLET, FILM COATED ORAL
Qty: 0 | Refills: 0 | DISCHARGE
Start: 2025-02-20

## 2025-02-20 RX ORDER — AMLODIPINE BESYLATE 10 MG/1
1 TABLET ORAL
Qty: 30 | Refills: 0
Start: 2025-02-20 | End: 2025-03-21

## 2025-02-20 RX ORDER — BISACODYL 5 MG
1 TABLET, DELAYED RELEASE (ENTERIC COATED) ORAL
Qty: 30 | Refills: 0
Start: 2025-02-20 | End: 2025-03-21

## 2025-02-20 RX ADMIN — Medication 1 APPLICATION(S): at 11:39

## 2025-02-20 RX ADMIN — INSULIN LISPRO 2: 100 INJECTION, SOLUTION INTRAVENOUS; SUBCUTANEOUS at 11:37

## 2025-02-20 RX ADMIN — Medication 650 MILLIGRAM(S): at 09:47

## 2025-02-20 RX ADMIN — AMLODIPINE BESYLATE 2.5 MILLIGRAM(S): 10 TABLET ORAL at 06:18

## 2025-02-20 RX ADMIN — APIXABAN 10 MILLIGRAM(S): 2.5 TABLET, FILM COATED ORAL at 06:18

## 2025-02-20 RX ADMIN — POLYETHYLENE GLYCOL 3350 17 GRAM(S): 17 POWDER, FOR SOLUTION ORAL at 06:18

## 2025-02-20 RX ADMIN — CLONAZEPAM 0.25 MILLIGRAM(S): 0.5 TABLET ORAL at 06:18

## 2025-02-20 NOTE — DISCHARGE NOTE NURSING/CASE MANAGEMENT/SOCIAL WORK - FINANCIAL ASSISTANCE
[facility] provides services at a reduced cost to those who are determined to be eligible through [facility]’s financial assistance program. Information regarding [facility]’s financial assistance program can be found by going to [email or web address] or by calling [phone].

## 2025-02-20 NOTE — PROGRESS NOTE ADULT - SUBJECTIVE AND OBJECTIVE BOX
24H events:    Patient is a 74y old Female who presents with a chief complaint of PE w RHS (19 Feb 2025 14:03)    Primary diagnosis of Pulmonary embolism    Today is 7d of hospitalization. This morning patient was seen and examined at bedside, resting comfortably in bed.    No acute or major events overnight.    Pt anticipated for discharge this am. pt will be sent home with senna, and miralax.       PAST MEDICAL & SURGICAL HISTORY  Parkinsons    Hypertension    Hyperlipidemia    Diabetes      SOCIAL HISTORY:  Social History:      ALLERGIES:  No Known Allergies    MEDICATIONS:  STANDING MEDICATIONS  amLODIPine   Tablet 2.5 milliGRAM(s) Oral daily  apixaban 10 milliGRAM(s) Oral every 12 hours  bisacodyl 5 milliGRAM(s) Oral at bedtime  chlorhexidine 2% Cloths 1 Application(s) Topical <User Schedule>  clonazePAM Oral Disintegrating Tablet 0.25 milliGRAM(s) Oral two times a day  CREXONT 70mg/280mg Extended release caps 2 Capsule(s) 2 Capsule(s) Oral four times a day  dextrose 5%. 1000 milliLiter(s) IV Continuous <Continuous>  dextrose 5%. 1000 milliLiter(s) IV Continuous <Continuous>  dextrose 50% Injectable 25 Gram(s) IV Push once  dextrose 50% Injectable 12.5 Gram(s) IV Push once  dextrose 50% Injectable 25 Gram(s) IV Push once  glucagon  Injectable 1 milliGRAM(s) IntraMuscular once  insulin lispro (ADMELOG) corrective regimen sliding scale   SubCutaneous three times a day before meals  insulin lispro (ADMELOG) corrective regimen sliding scale   SubCutaneous at bedtime  polyethylene glycol 3350 17 Gram(s) Oral two times a day  senna 2 Tablet(s) Oral at bedtime    PRN MEDICATIONS  acetaminophen     Tablet .. 650 milliGRAM(s) Oral every 6 hours PRN  aluminum hydroxide/magnesium hydroxide/simethicone Suspension 30 milliLiter(s) Oral every 4 hours PRN  dextrose Oral Gel 15 Gram(s) Oral once PRN  melatonin 3 milliGRAM(s) Oral at bedtime PRN  ondansetron Injectable 4 milliGRAM(s) IV Push every 8 hours PRN  zolpidem 5 milliGRAM(s) Oral at bedtime PRN    VITALS:   T(F): 97.6  HR: 90  BP: 121/65  RR: 19  SpO2: 96%    PHYSICAL EXAM:  General: old lady not in distress   HEENT: NC/AT; PERRL, clear conjunctiva  Neck: supple  Cardiovascular: +S1/S2; RRR  Respiratory: CTA b/l; no W/R/R  Gastrointestinal: soft, NT/ND; +BSx4  Extremities: WWP; 2+ peripheral pulses; no edema   Neurological: AAOx3; no focal deficits    LABS:                        13.0   7.79  )-----------( 194      ( 20 Feb 2025 07:18 )             37.9     02-20    141  |  103  |  20  ----------------------------<  111[H]  3.9   |  22  |  0.6[L]    Ca    8.5      20 Feb 2025 07:18  Mg     1.9     02-20    TPro  5.8[L]  /  Alb  3.7  /  TBili  1.0  /  DBili  x   /  AST  17  /  ALT  <5  /  AlkPhos  72  02-19      Urinalysis Basic - ( 20 Feb 2025 07:18 )    Color: x / Appearance: x / SG: x / pH: x  Gluc: 111 mg/dL / Ketone: x  / Bili: x / Urobili: x   Blood: x / Protein: x / Nitrite: x   Leuk Esterase: x / RBC: x / WBC x   Sq Epi: x / Non Sq Epi: x / Bacteria: x

## 2025-02-20 NOTE — DISCHARGE NOTE NURSING/CASE MANAGEMENT/SOCIAL WORK - NSDCPEFALRISK_GEN_ALL_CORE
For information on Fall & Injury Prevention, visit: [email or web address] OR  [email or web address] OR  https://www.cdc.gov/steadi/patient.html

## 2025-02-20 NOTE — PROGRESS NOTE ADULT - PROVIDER SPECIALTY LIST ADULT
Internal Medicine
Internal Medicine
Hospitalist
Internal Medicine

## 2025-02-20 NOTE — PROGRESS NOTE ADULT - ASSESSMENT
74F PMH Parkinson's, DM2, HTN, HLD presents for evaluation of Altered Mental Status. Found to have acute pulmonary embolism.    #Acute Bilateral PEs with RHS, LE DVT   -No intervention per IR   -Eliquis 10mg po BID x7 days then 5mg po bid for at least 6 months and to f/u with Hematologist outpt   -Pt will need f/u with PMD to screen for malignancy and hypercoagulable state     #Encephalopathy Rule out Seizures vs Delirium  - CTH unremarkable  - UCx negative   - off abx   - VEEG neg, s/p Neuro eval,   - pt back to baseline     #Parkinson  -C/w crexont, clonazepam, ambien   -Son states CREXONT is a new medication and wondering if it can cause confusion and AMS    #constipation: add lactulose and dulcolax     #DM2  #HTN  #HLD  -ISS  - norvasc 2.5mg qd for nebivolol 5 BID    DVT PPX- Eliquis   Gi Proph- Protonix     #Progress Note Handoff  Pending (specify):  DC to STR placement    pt anticipated for discharge this am.

## 2025-02-20 NOTE — DISCHARGE NOTE NURSING/CASE MANAGEMENT/SOCIAL WORK - PATIENT PORTAL LINK FT
You can access the FollowMyHealth Patient Portal offered by [facility] by registering at the following website: [email or web address]. By joining [employer]’s FollowMyHealth portal, you will also be able to view your health information using other applications (apps) compatible with our system.

## 2025-02-24 PROBLEM — Z00.00 ENCOUNTER FOR PREVENTIVE HEALTH EXAMINATION: Status: ACTIVE | Noted: 2025-02-24

## 2025-02-26 DIAGNOSIS — R33.9 RETENTION OF URINE, UNSPECIFIED: ICD-10-CM

## 2025-02-26 DIAGNOSIS — N39.0 URINARY TRACT INFECTION, SITE NOT SPECIFIED: ICD-10-CM

## 2025-02-26 DIAGNOSIS — I26.09 OTHER PULMONARY EMBOLISM WITH ACUTE COR PULMONALE: ICD-10-CM

## 2025-02-26 DIAGNOSIS — G20.B1 PARKINSON'S DISEASE WITH DYSKINESIA, WITHOUT MENTION OF FLUCTUATIONS: ICD-10-CM

## 2025-02-26 DIAGNOSIS — E78.5 HYPERLIPIDEMIA, UNSPECIFIED: ICD-10-CM

## 2025-02-26 DIAGNOSIS — I10 ESSENTIAL (PRIMARY) HYPERTENSION: ICD-10-CM

## 2025-02-26 DIAGNOSIS — I82.442 ACUTE EMBOLISM AND THROMBOSIS OF LEFT TIBIAL VEIN: ICD-10-CM

## 2025-02-26 DIAGNOSIS — K59.00 CONSTIPATION, UNSPECIFIED: ICD-10-CM

## 2025-02-26 DIAGNOSIS — E83.42 HYPOMAGNESEMIA: ICD-10-CM

## 2025-02-26 DIAGNOSIS — E11.9 TYPE 2 DIABETES MELLITUS WITHOUT COMPLICATIONS: ICD-10-CM

## 2025-02-26 DIAGNOSIS — G93.41 METABOLIC ENCEPHALOPATHY: ICD-10-CM

## 2025-02-26 DIAGNOSIS — Z79.84 LONG TERM (CURRENT) USE OF ORAL HYPOGLYCEMIC DRUGS: ICD-10-CM

## 2025-03-10 ENCOUNTER — OUTPATIENT (OUTPATIENT)
Dept: OUTPATIENT SERVICES | Facility: HOSPITAL | Age: 75
LOS: 1 days | End: 2025-03-10
Payer: MEDICARE

## 2025-03-10 ENCOUNTER — APPOINTMENT (OUTPATIENT)
Age: 75
End: 2025-03-10
Payer: MEDICARE

## 2025-03-10 VITALS
DIASTOLIC BLOOD PRESSURE: 63 MMHG | RESPIRATION RATE: 19 BRPM | HEART RATE: 80 BPM | SYSTOLIC BLOOD PRESSURE: 100 MMHG | OXYGEN SATURATION: 99 %

## 2025-03-10 DIAGNOSIS — Z51.81 ENCOUNTER FOR THERAPEUTIC DRUG LVL MONITORING: ICD-10-CM

## 2025-03-10 DIAGNOSIS — I26.99 OTHER PULMONARY EMBOLISM W/OUT ACUTE COR PULMONALE: ICD-10-CM

## 2025-03-10 DIAGNOSIS — G20.C PARKINSONISM, UNSPECIFIED: ICD-10-CM

## 2025-03-10 DIAGNOSIS — Z79.01 LONG TERM (CURRENT) USE OF ANTICOAGULANTS: ICD-10-CM

## 2025-03-10 DIAGNOSIS — I10 ESSENTIAL (PRIMARY) HYPERTENSION: ICD-10-CM

## 2025-03-10 DIAGNOSIS — Z79.01 ENCOUNTER FOR THERAPEUTIC DRUG LVL MONITORING: ICD-10-CM

## 2025-03-10 DIAGNOSIS — Z86.79 PERSONAL HISTORY OF OTHER DISEASES OF THE CIRCULATORY SYSTEM: ICD-10-CM

## 2025-03-10 DIAGNOSIS — Z86.39 PERSONAL HISTORY OF OTHER ENDOCRINE, NUTRITIONAL AND METABOLIC DISEASE: ICD-10-CM

## 2025-03-10 PROCEDURE — G2211 COMPLEX E/M VISIT ADD ON: CPT

## 2025-03-10 PROCEDURE — 99204 OFFICE O/P NEW MOD 45 MIN: CPT

## 2025-03-11 DIAGNOSIS — Z79.01 LONG TERM (CURRENT) USE OF ANTICOAGULANTS: ICD-10-CM

## 2025-03-16 PROBLEM — Z51.81 ANTICOAGULATION MANAGEMENT ENCOUNTER: Status: ACTIVE | Noted: 2025-03-16

## 2025-03-16 PROBLEM — I26.99 PULMONARY EMBOLISM, OTHER: Status: ACTIVE | Noted: 2025-03-16

## 2025-03-26 ENCOUNTER — EMERGENCY (EMERGENCY)
Facility: HOSPITAL | Age: 75
LOS: 0 days | Discharge: LEFT BEFORE DISCHARGE | End: 2025-03-27
Attending: STUDENT IN AN ORGANIZED HEALTH CARE EDUCATION/TRAINING PROGRAM
Payer: MEDICARE

## 2025-03-26 VITALS
TEMPERATURE: 98 F | OXYGEN SATURATION: 97 % | WEIGHT: 139.99 LBS | RESPIRATION RATE: 18 BRPM | DIASTOLIC BLOOD PRESSURE: 76 MMHG | HEART RATE: 104 BPM | SYSTOLIC BLOOD PRESSURE: 113 MMHG | HEIGHT: 61 IN

## 2025-03-26 VITALS — TEMPERATURE: 99 F

## 2025-03-26 DIAGNOSIS — E11.9 TYPE 2 DIABETES MELLITUS WITHOUT COMPLICATIONS: ICD-10-CM

## 2025-03-26 DIAGNOSIS — R55 SYNCOPE AND COLLAPSE: ICD-10-CM

## 2025-03-26 DIAGNOSIS — G20.A1 PARKINSON'S DISEASE WITHOUT DYSKINESIA, WITHOUT MENTION OF FLUCTUATIONS: ICD-10-CM

## 2025-03-26 DIAGNOSIS — Z86.711 PERSONAL HISTORY OF PULMONARY EMBOLISM: ICD-10-CM

## 2025-03-26 DIAGNOSIS — Z79.01 LONG TERM (CURRENT) USE OF ANTICOAGULANTS: ICD-10-CM

## 2025-03-26 DIAGNOSIS — I10 ESSENTIAL (PRIMARY) HYPERTENSION: ICD-10-CM

## 2025-03-26 LAB
ALBUMIN SERPL ELPH-MCNC: 3.7 G/DL — SIGNIFICANT CHANGE UP (ref 3.5–5.2)
ALP SERPL-CCNC: 87 U/L — SIGNIFICANT CHANGE UP (ref 30–115)
ALT FLD-CCNC: <5 U/L — SIGNIFICANT CHANGE UP (ref 0–41)
ANION GAP SERPL CALC-SCNC: 10 MMOL/L — SIGNIFICANT CHANGE UP (ref 7–14)
APPEARANCE UR: CLEAR — SIGNIFICANT CHANGE UP
AST SERPL-CCNC: 9 U/L — SIGNIFICANT CHANGE UP (ref 0–41)
BASOPHILS # BLD AUTO: 0.01 K/UL — SIGNIFICANT CHANGE UP (ref 0–0.2)
BASOPHILS NFR BLD AUTO: 0.2 % — SIGNIFICANT CHANGE UP (ref 0–1)
BILIRUB SERPL-MCNC: 0.8 MG/DL — SIGNIFICANT CHANGE UP (ref 0.2–1.2)
BILIRUB UR-MCNC: NEGATIVE — SIGNIFICANT CHANGE UP
BUN SERPL-MCNC: 33 MG/DL — HIGH (ref 10–20)
CALCIUM SERPL-MCNC: 8.9 MG/DL — SIGNIFICANT CHANGE UP (ref 8.4–10.5)
CHLORIDE SERPL-SCNC: 101 MMOL/L — SIGNIFICANT CHANGE UP (ref 98–110)
CO2 SERPL-SCNC: 26 MMOL/L — SIGNIFICANT CHANGE UP (ref 17–32)
COLOR SPEC: SIGNIFICANT CHANGE UP
CREAT SERPL-MCNC: 1 MG/DL — SIGNIFICANT CHANGE UP (ref 0.7–1.5)
DIFF PNL FLD: NEGATIVE — SIGNIFICANT CHANGE UP
EGFR: 59 ML/MIN/1.73M2 — LOW
EGFR: 59 ML/MIN/1.73M2 — LOW
EOSINOPHIL # BLD AUTO: 0.01 K/UL — SIGNIFICANT CHANGE UP (ref 0–0.7)
EOSINOPHIL NFR BLD AUTO: 0.2 % — SIGNIFICANT CHANGE UP (ref 0–8)
FLUAV AG NPH QL: SIGNIFICANT CHANGE UP
FLUBV AG NPH QL: SIGNIFICANT CHANGE UP
GLUCOSE SERPL-MCNC: 140 MG/DL — HIGH (ref 70–99)
GLUCOSE UR QL: NEGATIVE MG/DL — SIGNIFICANT CHANGE UP
HCT VFR BLD CALC: 33.5 % — LOW (ref 37–47)
HGB BLD-MCNC: 11.2 G/DL — LOW (ref 12–16)
IMM GRANULOCYTES NFR BLD AUTO: 0.6 % — HIGH (ref 0.1–0.3)
KETONES UR-MCNC: 15 MG/DL
LEUKOCYTE ESTERASE UR-ACNC: ABNORMAL
LYMPHOCYTES # BLD AUTO: 1.15 K/UL — LOW (ref 1.2–3.4)
LYMPHOCYTES # BLD AUTO: 17.5 % — LOW (ref 20.5–51.1)
MAGNESIUM SERPL-MCNC: 1.6 MG/DL — LOW (ref 1.8–2.4)
MCHC RBC-ENTMCNC: 30.3 PG — SIGNIFICANT CHANGE UP (ref 27–31)
MCHC RBC-ENTMCNC: 33.4 G/DL — SIGNIFICANT CHANGE UP (ref 32–37)
MCV RBC AUTO: 90.5 FL — SIGNIFICANT CHANGE UP (ref 81–99)
MONOCYTES # BLD AUTO: 0.38 K/UL — SIGNIFICANT CHANGE UP (ref 0.1–0.6)
MONOCYTES NFR BLD AUTO: 5.8 % — SIGNIFICANT CHANGE UP (ref 1.7–9.3)
NEUTROPHILS # BLD AUTO: 4.99 K/UL — SIGNIFICANT CHANGE UP (ref 1.4–6.5)
NEUTROPHILS NFR BLD AUTO: 75.7 % — HIGH (ref 42.2–75.2)
NITRITE UR-MCNC: NEGATIVE — SIGNIFICANT CHANGE UP
NRBC BLD AUTO-RTO: 0 /100 WBCS — SIGNIFICANT CHANGE UP (ref 0–0)
PH UR: 6.5 — SIGNIFICANT CHANGE UP (ref 5–8)
PLATELET # BLD AUTO: 286 K/UL — SIGNIFICANT CHANGE UP (ref 130–400)
PMV BLD: 12.4 FL — HIGH (ref 7.4–10.4)
POTASSIUM SERPL-MCNC: 4.6 MMOL/L — SIGNIFICANT CHANGE UP (ref 3.5–5)
POTASSIUM SERPL-SCNC: 4.6 MMOL/L — SIGNIFICANT CHANGE UP (ref 3.5–5)
PROT SERPL-MCNC: 5.8 G/DL — LOW (ref 6–8)
PROT UR-MCNC: 30 MG/DL
RBC # BLD: 3.7 M/UL — LOW (ref 4.2–5.4)
RBC # FLD: 15 % — HIGH (ref 11.5–14.5)
RSV RNA NPH QL NAA+NON-PROBE: SIGNIFICANT CHANGE UP
SARS-COV-2 RNA SPEC QL NAA+PROBE: SIGNIFICANT CHANGE UP
SODIUM SERPL-SCNC: 137 MMOL/L — SIGNIFICANT CHANGE UP (ref 135–146)
SOURCE RESPIRATORY: SIGNIFICANT CHANGE UP
SP GR SPEC: 1.03 — SIGNIFICANT CHANGE UP (ref 1–1.03)
TROPONIN T, HIGH SENSITIVITY RESULT: 16 NG/L — HIGH (ref 6–13)
UROBILINOGEN FLD QL: 2 MG/DL (ref 0.2–1)
WBC # BLD: 6.58 K/UL — SIGNIFICANT CHANGE UP (ref 4.8–10.8)
WBC # FLD AUTO: 6.58 K/UL — SIGNIFICANT CHANGE UP (ref 4.8–10.8)

## 2025-03-26 PROCEDURE — 80053 COMPREHEN METABOLIC PANEL: CPT

## 2025-03-26 PROCEDURE — 99285 EMERGENCY DEPT VISIT HI MDM: CPT | Mod: FS

## 2025-03-26 PROCEDURE — 70450 CT HEAD/BRAIN W/O DYE: CPT | Mod: MC

## 2025-03-26 PROCEDURE — 70450 CT HEAD/BRAIN W/O DYE: CPT | Mod: 26

## 2025-03-26 PROCEDURE — 71045 X-RAY EXAM CHEST 1 VIEW: CPT

## 2025-03-26 PROCEDURE — 71275 CT ANGIOGRAPHY CHEST: CPT | Mod: MC

## 2025-03-26 PROCEDURE — 87086 URINE CULTURE/COLONY COUNT: CPT

## 2025-03-26 PROCEDURE — 83735 ASSAY OF MAGNESIUM: CPT

## 2025-03-26 PROCEDURE — 36415 COLL VENOUS BLD VENIPUNCTURE: CPT

## 2025-03-26 PROCEDURE — 93005 ELECTROCARDIOGRAM TRACING: CPT

## 2025-03-26 PROCEDURE — 84484 ASSAY OF TROPONIN QUANT: CPT | Mod: 91

## 2025-03-26 PROCEDURE — 36000 PLACE NEEDLE IN VEIN: CPT | Mod: XU

## 2025-03-26 PROCEDURE — 93010 ELECTROCARDIOGRAM REPORT: CPT

## 2025-03-26 PROCEDURE — 93010 ELECTROCARDIOGRAM REPORT: CPT | Mod: 77

## 2025-03-26 PROCEDURE — 81001 URINALYSIS AUTO W/SCOPE: CPT

## 2025-03-26 PROCEDURE — 99285 EMERGENCY DEPT VISIT HI MDM: CPT | Mod: 25

## 2025-03-26 PROCEDURE — 71045 X-RAY EXAM CHEST 1 VIEW: CPT | Mod: 26

## 2025-03-26 PROCEDURE — 0241U: CPT

## 2025-03-26 PROCEDURE — 85025 COMPLETE CBC W/AUTO DIFF WBC: CPT

## 2025-03-26 RX ORDER — MAGNESIUM SULFATE 500 MG/ML
1 SYRINGE (ML) INJECTION ONCE
Refills: 0 | Status: COMPLETED | OUTPATIENT
Start: 2025-03-26 | End: 2025-03-26

## 2025-03-26 RX ADMIN — Medication 100 GRAM(S): at 22:25

## 2025-03-26 NOTE — ED ADULT NURSE NOTE - OBJECTIVE STATEMENT
BIBEMS from home for syncopal episode yesterday, denies a fall. Pt had episode of constant shaking for 2 minutes an hour and a half ago. Denies history of seizures.  PMH Parkinsons.  placed pt on cardiac monitor

## 2025-03-26 NOTE — ED PROVIDER NOTE - PATIENT PORTAL LINK FT
You can access the FollowMyHealth Patient Portal offered by [facility] by registering at the following website: [email or web address]. By joining [employer]’s FollowMyHealth portal, you will also be able to view your health information using other applications (apps) compatible with our system.

## 2025-03-26 NOTE — ED PROVIDER NOTE - CLINICAL SUMMARY MEDICAL DECISION MAKING FREE TEXT BOX
Pt with syncopal episode x2, no CP no SOB, hx of DVT/PE on AC    CTA negative    Independently interpretted by Thom Reynoso DO:  XR interpretation: No cardiopulmonary disease,   EKG interpretation: no JENA, NSR    Cardiac workup is warranted    I had extensive discussion of risks and benefits of pursuing further medical evaluation and/or care with patient and any available family/friends; patient still electing to leave against medical advice. Patient is awake, alert, oriented and demonstrates full capacity and insight into illness. Patient aware and encouraged to return immediately to ED or nearest ED if patient decides to change mind regarding care or if patient experiences any new, worsening, or concerning symptoms.

## 2025-03-26 NOTE — ED PROVIDER NOTE - PHYSICAL EXAMINATION
Physical Exam    Vital Signs: I have reviewed the initial vital signs.  Constitutional: well-nourished, appears stated age, no acute distress  Eyes: Conjunctiva pink, Sclera clear, PERRLA, EOMI without pain. no nystagmus.   Cardiovascular: regular rate, regular rhythm, well-perfused extremities, radial pulses equal and 2+ b/l.   Respiratory: unlabored respiratory effort, clear to auscultation bilaterally no wheezing, rales and rhonchi. pt is speaking full sentences. no accessory muscle use.   Gastrointestinal: soft, non-tender, nondistended abdomen, no pulsatile mass, no rebound, no guarding  Musculoskeletal: supple neck, no lower extremity edema, no calf tenderness, FROM of b/l upper and lower extremities  Integumentary: warm, dry, no rash  Neurologic: awake, alert, cranial nerves II-XII grossly intact, extremities’ motor and sensory functions grossly intact. finger to nose intact. negative pronator drift. 5/5 strength throughout  Psychiatric: appropriate mood, appropriate affect

## 2025-03-26 NOTE — ED ADULT TRIAGE NOTE - CHIEF COMPLAINT QUOTE
BIBEMS from home for syncopal episode yesterday, denies a fall. Pt had episode of constant shaking for 2 minutes an hour and a half ago. Denies history of seizures.  PMH Parkinsons.

## 2025-03-26 NOTE — ED PROVIDER NOTE - OBJECTIVE STATEMENT
No... 74-year-old female with a past medical history of PE on Eliquis, diabetes, Parkinson's, hypertension followed by cardiologist Dr. Jean Claude RAMIREZ in [place] presents to the ED for evaluation of syncope. As per patient's family at the bedside at around 6 PM yesterday evening while patient was sitting down she turned to yellow color and slumped over in the chair and was snoring for about 2 to 3 minutes but after drawing water on her arm she regained consciousness and could not recall what happened. EMS was called to the house and examined the patient but since she did not want to go to the hospital the family kept her home. Today patient's family reports that patient went to the bathroom and for about 2 minutes she had full body shaking but was fully awake and understood what was happening. As per patient's family did report that for the past month patient has been eating less and they think she lost about 15 pounds. Patient denies headache, dizziness, visual changes, chest pain, shortness of breath, back pain, abdominal pain, nausea, vomiting, diarrhea, constipation, urinary symptoms, tongue biting, hx of seizures, urinary or bowel retention or incontinence, recent travel, recent sick contacts, or recent trauma. pt lives at home with sister and has aid for 7 hours a day 7 days a week as per family. pt uses walker to ambulate.

## 2025-03-26 NOTE — ED PROVIDER NOTE - CARE PROVIDER_API CALL
Sandra Salas  Internal Medicine  [address]  [place] [address]  Phone: [phone]  Fax: [phone]  Follow Up Time: Urgent

## 2025-03-27 LAB — TROPONIN T, HIGH SENSITIVITY RESULT: 15 NG/L — HIGH (ref 6–13)

## 2025-03-27 PROCEDURE — 71275 CT ANGIOGRAPHY CHEST: CPT | Mod: 26

## 2025-04-03 NOTE — ED PROVIDER NOTE - CCCP TRG CHIEF CMPLNT
RE-ADMITTED; 3/31/2025 - present (3 days)  [facility]       no cough/no fever/no vomiting urinary symptoms

## 2025-06-11 ENCOUNTER — APPOINTMENT (OUTPATIENT)
Age: 75
End: 2025-06-11